# Patient Record
Sex: FEMALE | Race: WHITE | HISPANIC OR LATINO | Employment: UNEMPLOYED | URBAN - METROPOLITAN AREA
[De-identification: names, ages, dates, MRNs, and addresses within clinical notes are randomized per-mention and may not be internally consistent; named-entity substitution may affect disease eponyms.]

---

## 2017-09-19 ENCOUNTER — GENERIC CONVERSION - ENCOUNTER (OUTPATIENT)
Dept: OTHER | Facility: OTHER | Age: 62
End: 2017-09-19

## 2017-09-19 ENCOUNTER — ALLSCRIPTS OFFICE VISIT (OUTPATIENT)
Dept: OTHER | Facility: OTHER | Age: 62
End: 2017-09-19

## 2017-09-19 ENCOUNTER — TRANSCRIBE ORDERS (OUTPATIENT)
Dept: ADMINISTRATIVE | Facility: HOSPITAL | Age: 62
End: 2017-09-19

## 2017-09-19 DIAGNOSIS — R41.3 OTHER AMNESIA: ICD-10-CM

## 2017-09-19 DIAGNOSIS — G31.84 MCI (MILD COGNITIVE IMPAIRMENT) WITH MEMORY LOSS: Primary | ICD-10-CM

## 2017-09-19 DIAGNOSIS — G31.84 MILD COGNITIVE IMPAIRMENT: ICD-10-CM

## 2017-10-05 ENCOUNTER — GENERIC CONVERSION - ENCOUNTER (OUTPATIENT)
Dept: OTHER | Facility: OTHER | Age: 62
End: 2017-10-05

## 2017-10-23 ENCOUNTER — ALLSCRIPTS OFFICE VISIT (OUTPATIENT)
Dept: OTHER | Facility: OTHER | Age: 62
End: 2017-10-23

## 2017-10-24 NOTE — PROGRESS NOTES
Chief Complaint  CC: FULL BODY EXAM, LAST SEEN 2014  History of Present Illness  61-year-old female presents for overall checkup previously noted psoriasis  Patient had used topical steroids previously had not seen for over 3 years  Patient noted a flare of dry skin on her hands over the summer which appears to be getting better  Also concerned regarding numerous growths on her skin      Assessment  Assessed    1  Psoriasis vulgaris (696 1) (L40 0)   2  Seborrheic keratosis (702 19) (L82 1)   3  Screening for skin condition (V82 0) (Z13 89)    Active Problems  Problems    1  Benign essential hypertension (401 1) (I10)   2  Depressive disorder (311) (F32 9)   3  Female stress incontinence (625 6) (N39 3)   4  Generalized anxiety disorder (300 02) (F41 1)   5  GERD (gastroesophageal reflux disease) (530 81) (K21 9)   6  Heart murmur (785 2) (R01 1)   7  History of mixed hyperlipidemia (V12 29) (Z86 39)   8  Hypertension, unspecified type (401 9) (I10)   9  Insomnia (780 52) (G47 00)   10  Knee pain, left (719 46) (M25 562)   11  Lymphadenopathy (785 6) (R59 1)   12  MCI (mild cognitive impairment) (331 83) (G31 84)   13  Memory difficulties (780 93) (R41 3)   14  Neck pain (723 1) (M54 2)   15  Palpitations (785 1) (R00 2)   16  Psoriasis vulgaris (696 1) (L40 0)   17  Screening for skin condition (V82 0) (Z13 89)   18  Seborrheic keratosis (702 19) (L82 1)   19  Secondary polycythemia (289 0) (D75 1)   20  Type 2 diabetes with complication (317 70) (T09 0)   21  Vitamin D deficiency (268 9) (E55 9)    Past Medical History  Problems    1  History of Gallstones (574 20) (K80 20)   2  History of Hirsutism (704 1) (L68 0)   3  History of Hirsutism (704 1) (L68 0)   4  History of diabetes mellitus (V12 29) (Z86 39)   5  History of eczema (V13 3) (Z87 2)   6  History of gallbladder disease (V12 79) (Z87 19)   7  History of gastritis (V12 79) (Z87 19)   8  History of hyperlipidemia (V12 29) (Z86 39)   9   History of hypertension (V12 59) (Z86 79)   10  History of iron deficiency anemia (V12 3) (Z86 2)   11  History of psoriasis (V13 3) (Z87 2)   12  History of Hypertensive heart disease (402 90) (I11 9)   13  History of Hypertensive heart disease without heart failure (402 90) (I11 9)   14  History of Knee pain (719 46) (M25 569)   15  History of Phlebitis or thrombophlebitis of other deep vessel of lower extremity (451 19)    (I80 299)    The past medical history was reviewed  Surgical History    Surgical History reviewed      Family History  Family History Reviewed- Derm:   Family History was reviewed      Social History  The social history was reviewed      Current Meds   1  ALPRAZolam XR 1 MG Oral Tablet Extended Release 24 Hour; TAKE 1 TABLET Twice   daily PRN; Therapy: (Recorded:[id number]) to Recorded   2  Atorvastatin Calcium 10 MG Oral Tablet; Take 1 tablet daily; Therapy: (Recorded:75Dvr2320) to Recorded   3  Ergocalciferol 58355 UNIT CAPS; TAKE 1 CAPSULE Weekly; Therapy: (Recorded:63Mvk4930) to Recorded   4  Invokana 300 MG Oral Tablet; TAKE 1 TABLET EVERY MORNING; Therapy: (Recorded:46Zcf8427) to Recorded   5  Lantus 100 UNIT/ML Subcutaneous Solution; use as directed; Therapy: (Recorded:43Llt7426) to Recorded   6  Losartan Potassium 25 MG Oral Tablet; TAKE 1 TABLET DAILY AS DIRECTED; Therapy: (Recorded:57Djz1195) to Recorded   7  Meloxicam 7 5 MG Oral Tablet; take 1 tablet daily prn; Therapy: (Recorded:21Zly5353) to Recorded   8  MetFORMIN HCl  MG Oral Tablet Extended Release 24 Hour; [id number] to Recorded   9  Methocarbamol 750 MG Oral Tablet; take 1 tablet daily prn; Therapy: (Recorded:60Mbc5121) to Recorded   10  Metoprolol Tartrate 25 MG Oral Tablet; Take 1 tablet daily; Therapy: (Recorded:94Duh1333) to Recorded   11  Trospium Chloride ER 60 MG Oral Capsule Extended Release 24 Hour; take 1 capsule    daily; Therapy: (Recorded:44Ktk1543) to Recorded   12  Zolpidem Tartrate ER 12 5 MG Oral Tablet Extended Release; TAKE 1 TABLET AT    BEDTIME AS NEEDED FOR SLEEP; Therapy: (Recorded:82Umf3393) to Recorded    Review of Systems    Constitutional: Denies constitutional symptoms  Eyes: Denies eye symptoms  ENT:  denies ear symptoms, nasal symptoms, mouth or throat symptoms  Cardiovascular: Denies cardiovascular symptoms  Respiratory: Denies respiratory symptoms  Gastrointestinal: Denies gastrointestinal symptoms  Musculoskeletal: Denies musculoskeletal symptoms  Integumentary: Denies symptoms other than stated above  Neurological: Denies neurologic symptoms  Psychiatric: Denies psychiatric symptoms  Endocrine: Denies endocrine symptoms  Hematologic/Lymphatic: Denies hematologic symptoms  Allergies  Medication    1  Lexapro   2  Penicillins   3  Sulfa Drugs   4  Tubersol    Physical Exam    Constitutional   General appearance: Appears healthy and well developed  Lymphatic   No visible disturbance  Musculoskeletal   Digits and nails: No clubbing, cyanosis or edema  Cutaneous and nail exam normal     Skin   Scalp skin texture and hair distribution: Normal skin texture on scalp, normal hair distribution  Head: Normal turgor, no rashes, no lesions  Neck: Normal turgor, no rashes, no lesions  Chest: Normal turgor, no rashes, no lesions  Abdomen: Normal turgor, no rashes, no lesions  Back: Normal turgor, no rashes, no lesions  Right upper extremity: Abnormal     Left upper extremity: Abnormal     Right lower extremity: Normal turgor, no rashes, no lesions  Left lower extremity: Normal turgor, no rashes, no lesions  Neuro/Psych   Alert and oriented x 3  Displays comfort and cooperation during encounterl   Affect is normal     Finding Slight erythema scaling noted on the right arm also little bit on the left elbow normal keratotic papules with greasy stuck appearance nothing else atypical noted on complete exam  Plan  Psoriasis vulgaris    · Fluocinonide 0 05 % External Cream; APPLY SPARINGLY TO AFFECTED  AREA(S) TWICE DAILY  Screening for skin condition    · Follow-up visit in 1 year Evaluation and Treatment  Follow-up  Status: Hold For -  Scheduling  Requested for: [id number]    Discussion/Summary    Assessment #1: Psoriasis  Care Plan:   Minimally active continue topical therapy refills given follow-up if any worsening is noted  Assessment #2: Seborrheic keratosis  Care Plan:   Patient reassured these are normal growths we acquire with age no treatment needed  Assessment #3: Screening for dermatologic disorders  Care Plan:   Nothing else of concern noted on exam follow-up yearly        Future Appointments    Date/Time Provider Specialty Site   01/24/2018 02:40 PM Beba Mckeon MD Neurology [facility]     Signatures   Electronically signed by : PATTI Art ; Oct 23 2017  9:54AM EST                       (Author)

## 2018-01-22 VITALS
BODY MASS INDEX: 31.89 KG/M2 | DIASTOLIC BLOOD PRESSURE: 90 MMHG | WEIGHT: 180 LBS | RESPIRATION RATE: 16 BRPM | HEART RATE: 72 BPM | SYSTOLIC BLOOD PRESSURE: 132 MMHG | HEIGHT: 63 IN

## 2018-04-20 RX ORDER — ZOLPIDEM TARTRATE 12.5 MG/1
1 TABLET, FILM COATED, EXTENDED RELEASE ORAL
COMMUNITY
End: 2022-03-29 | Stop reason: ALTCHOICE

## 2018-04-20 RX ORDER — ATORVASTATIN CALCIUM 10 MG/1
1 TABLET, FILM COATED ORAL DAILY
COMMUNITY

## 2018-04-20 RX ORDER — MELOXICAM 7.5 MG/1
1 TABLET ORAL DAILY PRN
COMMUNITY
End: 2022-03-29 | Stop reason: ALTCHOICE

## 2018-04-20 RX ORDER — ERGOCALCIFEROL (VITAMIN D2) 1250 MCG
1 CAPSULE ORAL WEEKLY
COMMUNITY
End: 2022-03-29 | Stop reason: ALTCHOICE

## 2018-04-20 RX ORDER — LOSARTAN POTASSIUM 25 MG/1
1 TABLET ORAL DAILY
COMMUNITY

## 2018-04-20 RX ORDER — INSULIN GLARGINE 100 [IU]/ML
INJECTION, SOLUTION SUBCUTANEOUS
COMMUNITY

## 2018-04-20 RX ORDER — TROSPIUM CHLORIDE ER 60 MG/1
1 CAPSULE ORAL DAILY
COMMUNITY
End: 2022-03-29 | Stop reason: ALTCHOICE

## 2018-04-20 RX ORDER — METHOCARBAMOL 750 MG/1
1 TABLET, FILM COATED ORAL DAILY PRN
COMMUNITY
End: 2022-03-29 | Stop reason: ALTCHOICE

## 2018-04-20 RX ORDER — METFORMIN HYDROCHLORIDE 750 MG/1
TABLET, EXTENDED RELEASE ORAL
COMMUNITY

## 2018-04-20 RX ORDER — ALPRAZOLAM 1 MG/1
1 TABLET, EXTENDED RELEASE ORAL 2 TIMES DAILY PRN
COMMUNITY

## 2018-04-25 ENCOUNTER — OFFICE VISIT (OUTPATIENT)
Dept: NEUROLOGY | Facility: CLINIC | Age: 63
End: 2018-04-25
Payer: COMMERCIAL

## 2018-04-25 VITALS
HEART RATE: 114 BPM | WEIGHT: 164 LBS | BODY MASS INDEX: 29.06 KG/M2 | DIASTOLIC BLOOD PRESSURE: 90 MMHG | HEIGHT: 63 IN | SYSTOLIC BLOOD PRESSURE: 138 MMHG

## 2018-04-25 DIAGNOSIS — G31.84 MCI (MILD COGNITIVE IMPAIRMENT): Primary | ICD-10-CM

## 2018-04-25 DIAGNOSIS — F41.8 OTHER SPECIFIED ANXIETY DISORDERS: ICD-10-CM

## 2018-04-25 PROCEDURE — 99214 OFFICE O/P EST MOD 30 MIN: CPT | Performed by: PSYCHIATRY & NEUROLOGY

## 2018-04-25 NOTE — PROGRESS NOTES
Progress Note - Neurology   Mckenzie Garrdio 65 y o  female MRN: [id number]  Unit/Bed#:  Encounter: [id number]      Subjective:   Patient is here for follow-up visit with a history of anxiety, cognitive impairment, continues to have forgetfulness, but still remains quite anxious  She is also grieving the loss of her daughter and her  suffered a stroke, but overall she has been gradually improving and feeling better  Since her last visit the patient was advised blood work and an MRI which she has not been able to get done yet and claims her insurance company denied the same  ROS:   Review of Systems   Constitutional: Positive for appetite change  HENT: Positive for mouth sores  Eyes: Negative  Respiratory: Negative  Cardiovascular: Negative  Gastrointestinal: Negative  Endocrine: Negative  Genitourinary: Positive for frequency  Musculoskeletal: Negative  Skin: Negative  Allergic/Immunologic: Negative  Neurological: Positive for speech difficulty  Hematological: Bruises/bleeds easily  Psychiatric/Behavioral: The patient is nervous/anxious  Vitals:   Vitals:    04/25/18 0842   BP: 138/90   Pulse: (!) 114   ,Body mass index is 28 82 kg/m²      MEDS:      Current Outpatient Prescriptions:     ALPRAZolam ER (XANAX XR) 1 MG 24 hr tablet, Take 1 tablet by mouth 2 (two) times a day as needed, Disp: , Rfl:     atorvastatin (LIPITOR) 10 mg tablet, Take 1 tablet by mouth daily, Disp: , Rfl:     Canagliflozin (INVOKANA) 300 MG TABS, Take 1 tablet by mouth, Disp: , Rfl:     ergocalciferol (ERGOCALCIFEROL) 57381 units capsule, Take 1 capsule by mouth once a week, Disp: , Rfl:     insulin glargine (LANTUS) 100 units/mL subcutaneous injection, Inject under the skin, Disp: , Rfl:     losartan (COZAAR) 25 mg tablet, Take 1 tablet by mouth daily, Disp: , Rfl:     metFORMIN (GLUCOPHAGE-XR) 750 mg 24 hr tablet, Take by mouth, Disp: , Rfl:     metoprolol tartrate (LOPRESSOR) 25 mg tablet, Take 1 tablet by mouth daily, Disp: , Rfl:     trospium (SANCTURA XR) 60 mg 24 hr capsule, Take 1 capsule by mouth daily, Disp: , Rfl:     zolpidem (AMBIEN CR) 12 5 MG CR tablet, Take 1 tablet by mouth, Disp: , Rfl:     fluocinonide (LIDEX) 0 05 % cream, Apply topically 2 (two) times a day, Disp: , Rfl:     meloxicam (MOBIC) 7 5 mg tablet, Take 1 tablet by mouth daily as needed, Disp: , Rfl:     methocarbamol (ROBAXIN) 750 mg tablet, Take 1 tablet by mouth daily as needed, Disp: , Rfl:   :    Physical Exam:  General appearance: alert, appears stated age and cooperative  Head: Normocephalic, without obvious abnormality, atraumatic    Neurologic:  Her examination shows the [facility] cognitive assessment scale of 23/30 and the rest of her neurological examination was essentially unremarkable  Lab Results: I have personally reviewed pertinent reports  Imaging Studies: I have personally reviewed pertinent reports  Assessment:  1  Mild cognitive impairment  2  Anxiety disorder  Plan:  Patient is advised to discontinue Ambien at this time which she does use frequently on a p r n  basis and does not wish to get any further evaluation at this time since she feels she has been gradually improving and her symptoms are most likely related to her mood and anxiety     She is advised to return back to see me in 3-4 months     4/25/2018,9:03 AM    Dictation voice to text software has been used in the creation of this document  Please consider this in light of any contextual or grammatical errors

## 2018-10-29 ENCOUNTER — OFFICE VISIT (OUTPATIENT)
Dept: DERMATOLOGY | Facility: CLINIC | Age: 63
End: 2018-10-29
Payer: COMMERCIAL

## 2018-10-29 DIAGNOSIS — Z13.89 SCREENING FOR SKIN CONDITION: ICD-10-CM

## 2018-10-29 DIAGNOSIS — L82.1 SEBORRHEIC KERATOSIS: ICD-10-CM

## 2018-10-29 DIAGNOSIS — L40.9 PSORIASIS: Primary | ICD-10-CM

## 2018-10-29 PROCEDURE — 99213 OFFICE O/P EST LOW 20 MIN: CPT | Performed by: DERMATOLOGY

## 2018-10-29 NOTE — PATIENT INSTRUCTIONS
Psoriasis under control continue same therapy  Seborrheic Keratosis  Patient reasurred these are normal growths we acquire with age no treatment needed    Screening for Dermatologic Disorders: Nothing else of concern noted on complete exam follow up in 1 year

## 2018-10-29 NOTE — PROGRESS NOTES
[facility] DERMATOLOGY  [address] [place] [address]  [phone]     MRN: [id number] : 1955  Encounter: [id number]  Patient Information: Ashkan Sow  Chief complaint:  Yearly check up    History of present illness:  72-year-old female with history of psoriasis presents for overall checkup no specific concerns noted  Past Medical History:   Diagnosis Date    Anxiety disorder     Benign essential hypertension     Depressive disorder     Diabetes mellitus with complication ([address] )     SANDI (generalized anxiety disorder)     GERD (gastroesophageal reflux disease)     Heart murmur     Insomnia     Knee pain, left     Lymphadenopathy     MCI (mild cognitive impairment)     Neck pain     Palpitations     Psoriasis vulgaris     Seborrheic keratosis     Secondary polycythemia     Vitamin D deficiency      Past Surgical History:   Procedure Laterality Date    BLADDER SURGERY       Social History   History   Alcohol Use No     History   Drug Use No     History   Smoking Status    Former Smoker   Smokeless Tobacco    Never Used     Family History   Problem Relation Age of Onset    Diabetes Mother      Meds/Allergies   Allergies   Allergen Reactions    Escitalopram Hives    Liraglutide Hives and Vomiting    Penicillins Hives    Tuberculin Ppd Other (See Comments)    Latex Rash    Sulfa Antibiotics Rash       Meds:  Prior to Admission medications    Medication Sig Start Date End Date Taking?  Authorizing Provider   ALPRAZolam ER (XANAX XR) 1 MG 24 hr tablet Take 1 tablet by mouth 2 (two) times a day as needed   Yes Historical Provider, MD   atorvastatin (LIPITOR) 10 mg tablet Take 1 tablet by mouth daily   Yes Historical Provider, MD   Canagliflozin (INVOKANA) 300 MG TABS Take 1 tablet by mouth   Yes Historical Provider, MD   ergocalciferol (ERGOCALCIFEROL) 09761 units capsule Take 1 capsule by mouth once a week   Yes Historical Provider, MD   fluocinonide (LIDEX) 0 05 % cream Apply topically 2 (two) times a day 7/24/14  Yes Historical Provider, MD   insulin glargine (LANTUS) 100 units/mL subcutaneous injection Inject under the skin   Yes Historical Provider, MD   losartan (COZAAR) 25 mg tablet Take 1 tablet by mouth daily   Yes Historical Provider, MD   meloxicam (MOBIC) 7 5 mg tablet Take 1 tablet by mouth daily as needed   Yes Historical Provider, MD   metFORMIN (GLUCOPHAGE-XR) 750 mg 24 hr tablet Take by mouth   Yes Historical Provider, MD   methocarbamol (ROBAXIN) 750 mg tablet Take 1 tablet by mouth daily as needed   Yes Historical Provider, MD   metoprolol tartrate (LOPRESSOR) 25 mg tablet Take 1 tablet by mouth daily   Yes Historical Provider, MD   trospium (SANCTURA XR) 60 mg 24 hr capsule Take 1 capsule by mouth daily   Yes Historical Provider, MD   zolpidem (AMBIEN CR) 12 5 MG CR tablet Take 1 tablet by mouth   Yes Historical Provider, MD       Subjective:     Review of Systems:    General: negative for - chills, fatigue, fever,  weight gain or weight loss  Psychological: negative for - anxiety, behavioral disorder, concentration difficulties, decreased libido, depression, irritability, memory difficulties, mood swings, sleep disturbances or suicidal ideation  ENT: negative for - hearing difficulties , nasal congestion, nasal discharge, oral lesions, sinus pain, sneezing, sore throat  Allergy and Immunology: negative for - hives, insect bite sensitivity,  Hematological and Lymphatic: negative for - bleeding problems, blood clots,bruising, swollen lymph nodes  Endocrine: negative for - hair pattern changes, hot flashes, malaise/lethargy, mood swings, palpitations, polydipsia/polyuria, skin changes, temperature intolerance or unexpected weight change  Respiratory: negative for - cough, hemoptysis, orthopnea, shortness of breath, or wheezing  Cardiovascular: negative for - chest pain, dyspnea on exertion, edema,  Gastrointestinal: negative for - abdominal pain, nausea/vomiting  Genito-Urinary: negative for - dysuria, incontinence, irregular/heavy menses or urinary frequency/urgency  Musculoskeletal: negative for - gait disturbance, joint pain, joint stiffness, joint swelling, muscle pain, muscular weakness  Dermatological:  As in HPI  Neurological: negative for confusion, dizziness, headaches, impaired coordination/balance, memory loss, numbness/tingling, seizures, speech problems, tremors or weakness       Objective: There were no vitals taken for this visit  Physical Exam:    General Appearance:    Alert, cooperative, no distress   Head:    Normocephalic, without obvious abnormality, atraumatic           Skin:   A full skin exam was performed including scalp, head scalp, eyes, ears, nose, lips, neck, chest, axilla, abdomen, back, buttocks, bilateral upper extremities, bilateral lower extremities, hands, feet, fingers, toes, fingernails, and toenails slight scaling erythematous patch noted on the left elbow a little bit on the left upper arm no other evidence of psoriasis noted normal keratotic papules with greasy stuck on appearance nothing else remarkable noted on exam     Assessment:     1  Psoriasis     2  Screening for skin condition     3  Seborrheic keratosis           Plan:   Psoriasis under control continue same therapy  Seborrheic Keratosis  Patient reasurred these are normal growths we acquire with age no treatment needed  Screening for Dermatologic Disorders: Nothing else of concern noted on complete exam follow up in 1 year       Robert Gonzalez MD  10/29/2018,7:59 AM    Portions of the record may have been created with voice recognition software   Occasional wrong word or "sound a like" substitutions may have occurred due to the inherent limitations of voice recognition software   Read the chart carefully and recognize, using context, where substitutions have occurred

## 2019-11-04 ENCOUNTER — OFFICE VISIT (OUTPATIENT)
Dept: DERMATOLOGY | Facility: CLINIC | Age: 64
End: 2019-11-04
Payer: COMMERCIAL

## 2019-11-04 DIAGNOSIS — Z13.89 SCREENING FOR SKIN CONDITION: ICD-10-CM

## 2019-11-04 DIAGNOSIS — L82.1 SEBORRHEIC KERATOSIS: ICD-10-CM

## 2019-11-04 DIAGNOSIS — L40.9 PSORIASIS: Primary | ICD-10-CM

## 2019-11-04 DIAGNOSIS — L65.9 ALOPECIA: ICD-10-CM

## 2019-11-04 PROCEDURE — 99213 OFFICE O/P EST LOW 20 MIN: CPT | Performed by: DERMATOLOGY

## 2019-11-04 RX ORDER — NICOTINE POLACRILEX 2 MG
GUM BUCCAL
COMMUNITY
End: 2022-03-29 | Stop reason: ALTCHOICE

## 2019-11-04 RX ORDER — UREA 10 %
10 LOTION (ML) TOPICAL
COMMUNITY

## 2019-11-04 RX ORDER — BUSPIRONE HYDROCHLORIDE 15 MG/1
15 TABLET ORAL 3 TIMES DAILY
COMMUNITY
End: 2022-03-29 | Stop reason: ALTCHOICE

## 2019-11-04 NOTE — PATIENT INSTRUCTIONS
Psoriasis appears quite minimal will go ahead and see if  This response suggest mild hydrocortisone cream at this time  Seborrheic Keratosis  Patient reasurred these are normal growths we acquire with age no treatment needed    Hair loss probably a combination of both telogen effluvium and pattern hair loss we discussed the potential using Rogaine  Screening for Dermatologic Disorders: Nothing else of concern noted on complete exam follow up in 1 year

## 2019-11-04 NOTE — PROGRESS NOTES
[address]  [address] [facility] [address]  [phone]  [phone]     MRN: [id number] : 1955  Encounter: [id number]  Patient Information: Lois Foster  Chief complaint:  Yearly skin check concerned regarding hair loss    History of present illness:  60-year-old female presents for overall skin check history of psoriasis in the past complaining of some scaling areas that developed also in the intergluteal area patient also notes hair loss  Past Medical History:   Diagnosis Date    Anxiety disorder     Benign essential hypertension     Depressive disorder     Diabetes mellitus with complication ([facility] [address] )     SANDI (generalized anxiety disorder)     GERD (gastroesophageal reflux disease)     Heart murmur     Insomnia     Knee pain, left     Lymphadenopathy     MCI (mild cognitive impairment)     Neck pain     Palpitations     Psoriasis vulgaris     Seborrheic keratosis     Secondary polycythemia     Vitamin D deficiency      Past Surgical History:   Procedure Laterality Date    BLADDER SURGERY       Social History   Social History     Substance and Sexual Activity   Alcohol Use No     Social History     Substance and Sexual Activity   Drug Use No     Social History     Tobacco Use   Smoking Status Former Smoker   Smokeless Tobacco Never Used     Family History   Problem Relation Age of Onset    Diabetes Mother      Meds/Allergies   Allergies   Allergen Reactions    Escitalopram Hives    Liraglutide Hives and Vomiting    Penicillins Hives    Tuberculin Ppd Other (See Comments)    Latex Rash    Sulfa Antibiotics Rash       Meds:  Prior to Admission medications    Medication Sig Start Date End Date Taking?  Authorizing Provider   ALPRAZolam ER (XANAX XR) 1 MG 24 hr tablet Take 1 tablet by mouth 2 (two) times a day as needed   Yes Historical Provider, MD   atorvastatin (LIPITOR) 10 mg tablet Take 1 tablet by mouth daily   Yes Historical Provider, MD   Biotin 1 MG CAPS Take by mouth   Yes Historical Provider, MD   busPIRone (BUSPAR) 15 mg tablet Take 15 mg by mouth 3 (three) times a day   Yes Historical Provider, MD   Canagliflozin (INVOKANA) 300 MG TABS Take 1 tablet by mouth   Yes Historical Provider, MD   ergocalciferol (ERGOCALCIFEROL) 06580 units capsule Take 1 capsule by mouth once a week   Yes Historical Provider, MD   insulin glargine (LANTUS) 100 units/mL subcutaneous injection Inject under the skin   Yes Historical Provider, MD   losartan (COZAAR) 25 mg tablet Take 1 tablet by mouth daily   Yes Historical Provider, MD   melatonin 1 mg Take 1 mg by mouth daily at bedtime   Yes Historical Provider, MD   metFORMIN (GLUCOPHAGE-XR) 750 mg 24 hr tablet Take by mouth   Yes Historical Provider, MD   metoprolol tartrate (LOPRESSOR) 25 mg tablet Take 1 tablet by mouth daily   Yes Historical Provider, MD   trospium (SANCTURA XR) 60 mg 24 hr capsule Take 1 capsule by mouth daily   Yes Historical Provider, MD   zolpidem (AMBIEN CR) 12 5 MG CR tablet Take 1 tablet by mouth   Yes Historical Provider, MD   fluocinonide (LIDEX) 0 05 % cream Apply topically 2 (two) times a day To psoriasis patches till resolve  Patient not taking: Reported on 11/4/2019 10/29/18   Tim Mcnair MD   meloxicam (MOBIC) 7 5 mg tablet Take 1 tablet by mouth daily as needed    Historical Provider, MD   methocarbamol (ROBAXIN) 750 mg tablet Take 1 tablet by mouth daily as needed    Historical Provider, MD       Subjective:     Review of Systems:    General: negative for - chills, fatigue, fever,  weight gain or weight loss  Psychological: negative for - anxiety, behavioral disorder, concentration difficulties, decreased libido, depression, irritability, memory difficulties, mood swings, sleep disturbances or suicidal ideation  ENT: negative for - hearing difficulties , nasal congestion, nasal discharge, oral lesions, sinus pain, sneezing, sore throat  Allergy and Immunology: negative for - hives, insect bite sensitivity,  Hematological and Lymphatic: negative for - bleeding problems, blood clots,bruising, swollen lymph nodes  Endocrine: negative for - hair pattern changes, hot flashes, malaise/lethargy, mood swings, palpitations, polydipsia/polyuria, skin changes, temperature intolerance or unexpected weight change  Respiratory: negative for - cough, hemoptysis, orthopnea, shortness of breath, or wheezing  Cardiovascular: negative for - chest pain, dyspnea on exertion, edema,  Gastrointestinal: negative for - abdominal pain, nausea/vomiting  Genito-Urinary: negative for - dysuria, incontinence, irregular/heavy menses or urinary frequency/urgency  Musculoskeletal: negative for - gait disturbance, joint pain, joint stiffness, joint swelling, muscle pain, muscular weakness  Dermatological:  As in HPI  Neurological: negative for confusion, dizziness, headaches, impaired coordination/balance, memory loss, numbness/tingling, seizures, speech problems, tremors or weakness       Objective: There were no vitals taken for this visit  Physical Exam:    General Appearance:    Alert, cooperative, no distress   Head:    Normocephalic, without obvious abnormality, atraumatic           Skin:   A full skin exam was performed including scalp, head scalp, eyes, ears, nose, lips, neck, chest, axilla, abdomen, back, buttocks, bilateral upper extremities, bilateral lower extremities, hands, feet, fingers, toes, fingernails, and toenails normal keratotic papules greasy stuck on appearance erythema scaling patches noted little bit in the intergluteal area on the left arm and on the nasal creases nothing else remarkable noted on exam some slight hair pull positive       Assessment:     1  Psoriasis  hydrocortisone 2 5 % cream   2  Seborrheic keratosis     3  Screening for skin condition     4   Alopecia           Plan:   Psoriasis appears quite minimal will go ahead and see if  This response suggest mild hydrocortisone cream at this time  Seborrheic Keratosis  Patient reasurred these are normal growths we acquire with age no treatment needed  Hair loss probably a combination of both telogen effluvium and pattern hair loss we discussed the potential using Rogaine  Screening for Dermatologic Disorders: Nothing else of concern noted on complete exam follow up in 1 year       Chad Fisher MD  11/4/2019,8:32 AM    Portions of the record may have been created with voice recognition software   Occasional wrong word or "sound a like" substitutions may have occurred due to the inherent limitations of voice recognition software   Read the chart carefully and recognize, using context, where substitutions have occurred

## 2020-12-08 ENCOUNTER — TRANSCRIBE ORDERS (OUTPATIENT)
Dept: ADMINISTRATIVE | Facility: HOSPITAL | Age: 65
End: 2020-12-08

## 2020-12-08 ENCOUNTER — TRANSCRIBE ORDERS (OUTPATIENT)
Dept: LAB | Facility: CLINIC | Age: 65
End: 2020-12-08

## 2020-12-08 DIAGNOSIS — E04.2 NONTOXIC MULTINODULAR GOITER: Primary | ICD-10-CM

## 2020-12-08 DIAGNOSIS — Z78.0 POST-MENOPAUSE: Primary | ICD-10-CM

## 2021-01-13 ENCOUNTER — OFFICE VISIT (OUTPATIENT)
Dept: DERMATOLOGY | Facility: CLINIC | Age: 66
End: 2021-01-13
Payer: COMMERCIAL

## 2021-01-13 VITALS — TEMPERATURE: 99 F

## 2021-01-13 DIAGNOSIS — L85.3 XEROSIS CUTIS: Primary | ICD-10-CM

## 2021-01-13 DIAGNOSIS — L40.9 PSORIASIS: ICD-10-CM

## 2021-01-13 DIAGNOSIS — D69.2 PIGMENTED PURPURA (HCC): ICD-10-CM

## 2021-01-13 DIAGNOSIS — L82.1 SEBORRHEIC KERATOSIS: ICD-10-CM

## 2021-01-13 DIAGNOSIS — Z13.89 SCREENING FOR SKIN CONDITION: ICD-10-CM

## 2021-01-13 PROCEDURE — 99213 OFFICE O/P EST LOW 20 MIN: CPT | Performed by: DERMATOLOGY

## 2021-01-13 RX ORDER — PAROXETINE HYDROCHLORIDE 20 MG/1
TABLET, FILM COATED ORAL EVERY 24 HOURS
COMMUNITY

## 2021-01-13 NOTE — PATIENT INSTRUCTIONS
Xerosis cutis patient advise that this is related dry skin related to her diabetes also probably related to her ambient dry area home with use of propane heat suggested a use of a humidifier as well as a with a moisturizer as much as possible  In addition suggested use of tepid water for bathing  Psoriasis does not appear real active no treatment needed  Pigmented purpura advised patient that this is related to inflammation in the veins in the lower leg and leakage of blood no treatment indicated however support stockings could be considered  Seborrheic Keratosis  Patient reasurred these are normal growths we acquire with age no treatment needed    Screening for Dermatologic Disorders: Nothing else of concern noted on complete exam follow up in 1 year

## 2021-01-13 NOTE — PROGRESS NOTES
[address] [facility] [address]  [phone]  [phone]     MRN: [id number] : 1955  Encounter: [id number]  Patient Information: Rivera Ferrari  Chief complaint: yearly check up    History of present illness:  52-year-old female with history of diabetes presents for overall checkup concerned regarding overall dry skin no other concerns noted except numerous other growths on her skin patient with previous history of psoriasis  Past Medical History:   Diagnosis Date    Anxiety disorder     Benign essential hypertension     Depressive disorder     Diabetes mellitus with complication ([facility] [address] )     SANDI (generalized anxiety disorder)     GERD (gastroesophageal reflux disease)     Heart murmur     Insomnia     Knee pain, left     Lymphadenopathy     MCI (mild cognitive impairment)     Neck pain     Palpitations     Psoriasis vulgaris     Seborrheic keratosis     Secondary polycythemia     Vitamin D deficiency      Past Surgical History:   Procedure Laterality Date    BLADDER SURGERY       Social History   Social History     Substance and Sexual Activity   Alcohol Use No     Social History     Substance and Sexual Activity   Drug Use No     Social History     Tobacco Use   Smoking Status Former Smoker   Smokeless Tobacco Never Used     Family History   Problem Relation Age of Onset    Diabetes Mother      Meds/Allergies   Allergies   Allergen Reactions    Escitalopram Hives    Liraglutide Hives and Vomiting    Penicillins Hives    Tuberculin Ppd Other (See Comments)    Latex Rash    Sulfa Antibiotics Rash       Meds:  Prior to Admission medications    Medication Sig Start Date End Date Taking?  Authorizing Provider   ALPRAZolam ER (XANAX XR) 1 MG 24 hr tablet Take 1 tablet by mouth 2 (two) times a day as needed   Yes Historical Provider, MD   atorvastatin (LIPITOR) 10 mg tablet Take 1 tablet by mouth daily   Yes Historical Provider, MD   Biotin 1 MG CAPS Take by mouth   Yes Historical Provider, MD   busPIRone (BUSPAR) 15 mg tablet Take 15 mg by mouth 3 (three) times a day   Yes Historical Provider, MD   Canagliflozin (INVOKANA) 300 MG TABS Take 1 tablet by mouth   Yes Historical Provider, MD   ergocalciferol (ERGOCALCIFEROL) 58936 units capsule Take 1 capsule by mouth once a week   Yes Historical Provider, MD   hydrocortisone 2 5 % cream Apply topically 2 (two) times a day To psoriasis till clear 11/4/19  Yes Chad Fisher MD   insulin glargine (LANTUS) 100 units/mL subcutaneous injection Inject under the skin   Yes Historical Provider, MD   losartan (COZAAR) 25 mg tablet Take 1 tablet by mouth daily   Yes Historical Provider, MD   melatonin 1 mg Take 1 mg by mouth daily at bedtime   Yes Historical Provider, MD   meloxicam (MOBIC) 7 5 mg tablet Take 1 tablet by mouth daily as needed   Yes Historical Provider, MD   metFORMIN (GLUCOPHAGE-XR) 750 mg 24 hr tablet Take by mouth   Yes Historical Provider, MD   methocarbamol (ROBAXIN) 750 mg tablet Take 1 tablet by mouth daily as needed   Yes Historical Provider, MD   metoprolol tartrate (LOPRESSOR) 25 mg tablet Take 1 tablet by mouth daily   Yes Historical Provider, MD   PARoxetine (PAXIL) 20 mg tablet every 24 hours   Yes Historical Provider, MD   trospium (SANCTURA XR) 60 mg 24 hr capsule Take 1 capsule by mouth daily   Yes Historical Provider, MD   zolpidem (AMBIEN CR) 12 5 MG CR tablet Take 1 tablet by mouth   Yes Historical Provider, MD   fluocinonide (LIDEX) 0 05 % cream Apply topically 2 (two) times a day To psoriasis patches till resolve  Patient not taking: Reported on 11/4/2019 10/29/18   Chad Fisher MD       Subjective:     Review of Systems:    General: negative for - chills, fatigue, fever,  weight gain or weight loss  Psychological: negative for - anxiety, behavioral disorder, concentration difficulties, decreased libido, depression, irritability, memory difficulties, mood swings, sleep disturbances or suicidal ideation  ENT: negative for - hearing difficulties , nasal congestion, nasal discharge, oral lesions, sinus pain, sneezing, sore throat  Allergy and Immunology: negative for - hives, insect bite sensitivity,  Hematological and Lymphatic: negative for - bleeding problems, blood clots,bruising, swollen lymph nodes  Endocrine: negative for - hair pattern changes, hot flashes, malaise/lethargy, mood swings, palpitations, polydipsia/polyuria, skin changes, temperature intolerance or unexpected weight change  Respiratory: negative for - cough, hemoptysis, orthopnea, shortness of breath, or wheezing  Cardiovascular: negative for - chest pain, dyspnea on exertion, edema,  Gastrointestinal: negative for - abdominal pain, nausea/vomiting  Genito-Urinary: negative for - dysuria, incontinence, irregular/heavy menses or urinary frequency/urgency  Musculoskeletal: negative for - gait disturbance, joint pain, joint stiffness, joint swelling, muscle pain, muscular weakness  Dermatological:  As in HPI  Neurological: negative for confusion, dizziness, headaches, impaired coordination/balance, memory loss, numbness/tingling, seizures, speech problems, tremors or weakness       Objective:   Temp 99 °F (37 2 °C)     Physical Exam:    General Appearance:    Alert, cooperative, no distress   Head:    Normocephalic, without obvious abnormality, atraumatic           Skin:   A full skin exam was performed including scalp, head scalp, eyes, ears, nose, lips, neck, chest, axilla, abdomen, back, buttocks, bilateral upper extremities, bilateral lower extremities, hands, feet, fingers, toes, fingernails, and toenails no active psoriasis noted normal keratotic papules with greasy stuck on appearance overall dry scaly skin nothing else remarkable on exam except slight hyperpigmentation noted on the lower legs     Assessment:     1  Xerosis cutis     2  Seborrheic keratosis     3  Psoriasis     4   Pigmented purpura ([address] )     5  Screening for skin condition           Plan:   Xerosis cutis patient advise that this is related dry skin related to her diabetes also probably related to her ambient dry area home with use of propane heat suggested a use of a humidifier as well as a with a moisturizer as much as possible  In addition suggested use of tepid water for bathing  Psoriasis does not appear real active no treatment needed  Pigmented purpura advised patient that this is related to inflammation in the veins in the lower leg and leakage of blood no treatment indicated however support stockings could be considered  Seborrheic Keratosis  Patient reasurred these are normal growths we acquire with age no treatment needed  Screening for Dermatologic Disorders: Nothing else of concern noted on complete exam follow up in 1 year       Kasey Thao MD  1/13/2021,10:04 AM    Portions of the record may have been created with voice recognition software   Occasional wrong word or "sound a like" substitutions may have occurred due to the inherent limitations of voice recognition software   Read the chart carefully and recognize, using context, where substitutions have occurred

## 2021-02-17 ENCOUNTER — TRANSCRIBE ORDERS (OUTPATIENT)
Dept: ADMINISTRATIVE | Facility: HOSPITAL | Age: 66
End: 2021-02-17

## 2021-02-17 DIAGNOSIS — R10.2 PERINEAL NEURALGIA, UNSPECIFIED LATERALITY: Primary | ICD-10-CM

## 2021-03-02 ENCOUNTER — HOSPITAL ENCOUNTER (OUTPATIENT)
Dept: ULTRASOUND IMAGING | Facility: CLINIC | Age: 66
Discharge: HOME | End: 2021-03-02
Payer: COMMERCIAL

## 2021-03-02 DIAGNOSIS — R10.2 PERINEAL NEURALGIA, UNSPECIFIED LATERALITY: ICD-10-CM

## 2021-03-02 PROCEDURE — 76856 US EXAM PELVIC COMPLETE: CPT

## 2021-03-02 PROCEDURE — 76830 TRANSVAGINAL US NON-OB: CPT

## 2021-03-10 DIAGNOSIS — Z23 ENCOUNTER FOR IMMUNIZATION: ICD-10-CM

## 2021-06-14 ENCOUNTER — TELEPHONE (OUTPATIENT)
Dept: GASTROENTEROLOGY | Facility: CLINIC | Age: 66
End: 2021-06-14

## 2021-11-22 ENCOUNTER — OFFICE VISIT (OUTPATIENT)
Dept: DERMATOLOGY | Facility: CLINIC | Age: 66
End: 2021-11-22
Payer: MEDICARE

## 2021-11-22 VITALS — HEIGHT: 63 IN | WEIGHT: 165.38 LBS | BODY MASS INDEX: 29.3 KG/M2

## 2021-11-22 DIAGNOSIS — L85.3 XEROSIS CUTIS: Primary | ICD-10-CM

## 2021-11-22 PROCEDURE — 99213 OFFICE O/P EST LOW 20 MIN: CPT | Performed by: DERMATOLOGY

## 2021-11-22 RX ORDER — METFORMIN HYDROCHLORIDE 500 MG/1
TABLET, EXTENDED RELEASE ORAL
COMMUNITY
Start: 2021-10-01

## 2021-11-22 RX ORDER — EMPAGLIFLOZIN 25 MG/1
25 TABLET, FILM COATED ORAL DAILY
COMMUNITY
Start: 2021-10-29

## 2022-02-28 ENCOUNTER — OFFICE VISIT (OUTPATIENT)
Dept: OBGYN CLINIC | Facility: CLINIC | Age: 67
End: 2022-02-28
Payer: MEDICARE

## 2022-02-28 VITALS
DIASTOLIC BLOOD PRESSURE: 80 MMHG | HEART RATE: 99 BPM | BODY MASS INDEX: 29.59 KG/M2 | WEIGHT: 167 LBS | HEIGHT: 63 IN | SYSTOLIC BLOOD PRESSURE: 138 MMHG

## 2022-02-28 DIAGNOSIS — M25.361 KNEE INSTABILITY, RIGHT: ICD-10-CM

## 2022-02-28 DIAGNOSIS — M62.9 HAMSTRING TIGHTNESS OF BOTH LOWER EXTREMITIES: ICD-10-CM

## 2022-02-28 DIAGNOSIS — R29.898 WEAKNESS OF BOTH HIPS: ICD-10-CM

## 2022-02-28 DIAGNOSIS — S76.311A HAMSTRING STRAIN, RIGHT, INITIAL ENCOUNTER: ICD-10-CM

## 2022-02-28 DIAGNOSIS — M17.11 PRIMARY OSTEOARTHRITIS OF RIGHT KNEE: Primary | ICD-10-CM

## 2022-02-28 PROCEDURE — 99204 OFFICE O/P NEW MOD 45 MIN: CPT | Performed by: FAMILY MEDICINE

## 2022-02-28 RX ORDER — MELOXICAM 15 MG/1
15 TABLET ORAL DAILY
Qty: 30 TABLET | Refills: 1 | Status: SHIPPED | OUTPATIENT
Start: 2022-02-28 | End: 2022-03-29 | Stop reason: ALTCHOICE

## 2022-02-28 NOTE — PROGRESS NOTES
Assessment/Plan:  Assessment/Plan   Diagnoses and all orders for this visit:    Primary osteoarthritis of right knee  -     meloxicam (Mobic) 15 mg tablet; Take 1 tablet (15 mg total) by mouth daily  -     Ambulatory Referral to Physical Therapy; Future    Weakness of both hips  -     Ambulatory Referral to Physical Therapy; Future    Hamstring tightness of both lower extremities  -     Ambulatory Referral to Physical Therapy; Future    Hamstring strain, right, initial encounter  -     Ambulatory Referral to Physical Therapy; Future    Knee instability, right  -     Brace  -     Ambulatory Referral to Physical Therapy; Future  -     Cane      81-year-old female with right knee pain and instability of 3 days duration  Discussed with patient physical exam, imaging studies, impression and plan  X-rays noted for degenerative changes of the right knee  Physical exam noted for medial soft tissue swelling  She has tenderness at the medial joint line, distal medial hamstring  She has extension limited to -5° and flexion to 110°  There is no appreciable collateral ligament laxity  She has hamstring tightness both lower extremities  She has weakness both hips with flexion and abduction  Clinical impression is that she is symptomatic from degenerative changes of the knee and muscle strain  I discussed regimen of anti-inflammatory, supplements, bracing, and physical therapy  She declines corticosteroid injection at this time  She is to take meloxicam 15 mg once daily with food for 30 days and during that time not to take any ibuprofen or Aleve  She may apply topical diclofenac gel as needed  She is to start taking tumeric 500 mg twice daily, tart cherry at least 1000 mg daily, and glucosamine-chondroitin 2 to 3 times a day  I provided her with hinged knee brace to help with stability  She is to start physical therapy as soon as possible and do home exercises as directed    She will follow up with me as needed  Subjective:   Patient ID: Linard Leventhal is a 73 y o  female  Chief Complaint   Patient presents with    Right Knee - Pain       61-year-old female presents for evaluation of right knee pain of 3 days duration  She denies any particular trauma or inciting event  Pain described as sudden in onset, localized to the medial aspect knee, nonradiating, worse with bearing weight and ambulating, worse with twisting, associated with swelling, and improved resting  She has been applying topical diclofenac gel to help with symptoms  She has had difficulty ambulating and has been using her mother's walking cane to help with stability  Knee Pain  This is a new problem  The current episode started in the past 7 days  The problem occurs daily  The problem has been unchanged  Associated symptoms include arthralgias and joint swelling  Pertinent negatives include no abdominal pain, chest pain, chills, fever, numbness, rash, sore throat or weakness  The symptoms are aggravated by standing, walking and twisting  She has tried rest and NSAIDs for the symptoms  The treatment provided mild relief  The following portions of the patient's history were reviewed and updated as appropriate: She  has a past medical history of Anxiety disorder, Benign essential hypertension, Depressive disorder, Diabetes mellitus with complication ([address] ), SANDI (generalized anxiety disorder), GERD (gastroesophageal reflux disease), Heart murmur, Insomnia, Knee pain, left, Lymphadenopathy, MCI (mild cognitive impairment), Neck pain, Palpitations, Psoriasis vulgaris, Seborrheic keratosis, Secondary polycythemia, and Vitamin D deficiency  She  has a past surgical history that includes Bladder surgery  Her family history includes Diabetes in her mother  She  reports that she has quit smoking  She has never used smokeless tobacco  She reports that she does not drink alcohol and does not use drugs    She is allergic to acetaminophen, escitalopram, hydrocodone-acetaminophen, liraglutide, penicillins, tuberculin ppd, latex, and sulfa antibiotics       Review of Systems   Constitutional: Negative for chills and fever  HENT: Negative for sore throat  Eyes: Negative for visual disturbance  Respiratory: Negative for shortness of breath  Cardiovascular: Negative for chest pain  Gastrointestinal: Negative for abdominal pain  Genitourinary: Negative for flank pain  Musculoskeletal: Positive for arthralgias and joint swelling  Skin: Negative for rash and wound  Neurological: Negative for weakness and numbness  Hematological: Does not bruise/bleed easily  Psychiatric/Behavioral: Negative for self-injury  Objective:  Vitals:    02/28/22 1349   BP: 138/80   Pulse: 99   Weight: 75 8 kg (167 lb)   Height: 5' 3 25" (1 607 m)     Right Ankle Exam     Tenderness   The patient is experiencing no tenderness  Swelling: none    Range of Motion   Dorsiflexion: normal   Plantar flexion: normal     Muscle Strength   Dorsiflexion:  5/5  Plantar flexion:  5/5      Right Knee Exam     Muscle Strength   The patient has normal right knee strength  Tenderness   The patient is experiencing tenderness in the medial joint line and medial hamstring  Range of Motion   Extension: -5   Flexion: 110     Tests   Varus: negative Valgus: negative    Other   Swelling: mild      Left Knee Exam     Other   Swelling: none      Right Hip Exam     Muscle Strength   Abduction: 4/5   Flexion: 4/5     Comments:  Hamstring tightness      Left Hip Exam     Muscle Strength   Abduction: 4/5   Flexion: 4/5     Comments:  Hamstring tightness          Strength/Myotome Testing     Right Ankle/Foot   Dorsiflexion: 5  Plantar flexion: 5      Physical Exam  Vitals and nursing note reviewed  Constitutional:       General: She is not in acute distress  Appearance: She is well-developed  She is not ill-appearing or diaphoretic  HENT:      Head: Normocephalic  Right Ear: External ear normal       Left Ear: External ear normal    Eyes:      Conjunctiva/sclera: Conjunctivae normal    Neck:      Trachea: No tracheal deviation  Cardiovascular:      Rate and Rhythm: Normal rate  Pulmonary:      Effort: Pulmonary effort is normal  No respiratory distress  Abdominal:      General: There is no distension  Musculoskeletal:         General: Swelling and tenderness present  No deformity or signs of injury  Skin:     General: Skin is warm and dry  Coloration: Skin is not jaundiced or pale  Neurological:      Mental Status: She is alert and oriented to person, place, and time  Psychiatric:         Mood and Affect: Mood normal          Behavior: Behavior normal          Thought Content:  Thought content normal          Judgment: Judgment normal

## 2022-03-01 ENCOUNTER — TELEPHONE (OUTPATIENT)
Dept: OBGYN CLINIC | Facility: CLINIC | Age: 67
End: 2022-03-01

## 2022-03-01 NOTE — TELEPHONE ENCOUNTER
After speaking with you, I s/w her, w/ your instructions to stop taking the meloxican  Alternate motrin & tylenol if that works better for her, use ice, and that you recommended having a cortisone inj  She understood everything and was greatful for the call  She wants to come in for the injection but will call me back because she can't get out of bed due to the kn pn and isn't able to put the brace on that you gave her  She will call me back to schedule the inj  When she is able to

## 2022-03-03 ENCOUNTER — APPOINTMENT (OUTPATIENT)
Dept: RADIOLOGY | Facility: CLINIC | Age: 67
End: 2022-03-03
Payer: MEDICARE

## 2022-03-03 ENCOUNTER — OFFICE VISIT (OUTPATIENT)
Dept: OBGYN CLINIC | Facility: CLINIC | Age: 67
End: 2022-03-03
Payer: MEDICARE

## 2022-03-03 VITALS
DIASTOLIC BLOOD PRESSURE: 81 MMHG | HEART RATE: 111 BPM | SYSTOLIC BLOOD PRESSURE: 139 MMHG | HEIGHT: 63 IN | BODY MASS INDEX: 29.59 KG/M2 | WEIGHT: 167 LBS

## 2022-03-03 DIAGNOSIS — M17.11 PRIMARY OSTEOARTHRITIS OF RIGHT KNEE: Primary | ICD-10-CM

## 2022-03-03 DIAGNOSIS — M17.11 PRIMARY OSTEOARTHRITIS OF RIGHT KNEE: ICD-10-CM

## 2022-03-03 DIAGNOSIS — M62.838 MUSCLE SPASM: ICD-10-CM

## 2022-03-03 DIAGNOSIS — M25.361 KNEE INSTABILITY, RIGHT: ICD-10-CM

## 2022-03-03 PROCEDURE — 73564 X-RAY EXAM KNEE 4 OR MORE: CPT

## 2022-03-03 PROCEDURE — 20610 DRAIN/INJ JOINT/BURSA W/O US: CPT | Performed by: FAMILY MEDICINE

## 2022-03-03 PROCEDURE — 99214 OFFICE O/P EST MOD 30 MIN: CPT | Performed by: FAMILY MEDICINE

## 2022-03-03 RX ORDER — LIDOCAINE HYDROCHLORIDE 10 MG/ML
2 INJECTION, SOLUTION INFILTRATION; PERINEURAL
Status: COMPLETED | OUTPATIENT
Start: 2022-03-03 | End: 2022-03-03

## 2022-03-03 RX ORDER — TRIAMCINOLONE ACETONIDE 40 MG/ML
20 INJECTION, SUSPENSION INTRA-ARTICULAR; INTRAMUSCULAR
Status: COMPLETED | OUTPATIENT
Start: 2022-03-03 | End: 2022-03-03

## 2022-03-03 RX ORDER — TRIAMCINOLONE ACETONIDE 40 MG/ML
40 INJECTION, SUSPENSION INTRA-ARTICULAR; INTRAMUSCULAR
Status: COMPLETED | OUTPATIENT
Start: 2022-03-03 | End: 2022-03-03

## 2022-03-03 RX ORDER — TIZANIDINE 4 MG/1
4 TABLET ORAL 2 TIMES DAILY PRN
Qty: 60 TABLET | Refills: 0 | Status: SHIPPED | OUTPATIENT
Start: 2022-03-03 | End: 2022-05-10

## 2022-03-03 RX ORDER — LIDOCAINE HYDROCHLORIDE 10 MG/ML
3 INJECTION, SOLUTION INFILTRATION; PERINEURAL
Status: COMPLETED | OUTPATIENT
Start: 2022-03-03 | End: 2022-03-03

## 2022-03-03 RX ORDER — BUPIVACAINE HYDROCHLORIDE 2.5 MG/ML
2 INJECTION, SOLUTION INFILTRATION; PERINEURAL
Status: COMPLETED | OUTPATIENT
Start: 2022-03-03 | End: 2022-03-03

## 2022-03-03 RX ADMIN — LIDOCAINE HYDROCHLORIDE 3 ML: 10 INJECTION, SOLUTION INFILTRATION; PERINEURAL at 11:12

## 2022-03-03 RX ADMIN — LIDOCAINE HYDROCHLORIDE 2 ML: 10 INJECTION, SOLUTION INFILTRATION; PERINEURAL at 11:12

## 2022-03-03 RX ADMIN — TRIAMCINOLONE ACETONIDE 40 MG: 40 INJECTION, SUSPENSION INTRA-ARTICULAR; INTRAMUSCULAR at 11:12

## 2022-03-03 RX ADMIN — TRIAMCINOLONE ACETONIDE 20 MG: 40 INJECTION, SUSPENSION INTRA-ARTICULAR; INTRAMUSCULAR at 11:12

## 2022-03-03 RX ADMIN — BUPIVACAINE HYDROCHLORIDE 2 ML: 2.5 INJECTION, SOLUTION INFILTRATION; PERINEURAL at 11:12

## 2022-03-03 NOTE — PROGRESS NOTES
Assessment/Plan:  Assessment/Plan   Diagnoses and all orders for this visit:    Primary osteoarthritis of right knee  -     XR knee 4+ vw right injury; Future  -     Medial  Knee Brace  -     Large joint arthrocentesis: R knee  -     Injection Procedure Prior Authorization; Future    Knee instability, right  -     Medial  Knee Brace    Muscle spasm  -     tiZANidine (ZANAFLEX) 4 mg tablet; Take 1 tablet (4 mg total) by mouth 2 (two) times a day as needed for muscle spasms        71-year-old female with osteoarthritis of right knee with right knee pain more than 1 week duration  Discussed with patient physical exam, radiographs, impression and plan  X-rays right knee noted for complete loss medial joint space with bone-on-bone  Physical exam right knee noted for swelling at the medial aspect  She has tenderness medial joint line  She has extension limited to -20°  There is laxity with valgus stress  Clinical impression is that she is symptomatic from degenerative changes of the knee  I discussed treatment regimen of corticosteroid injection,  brace, and Visco injection  I administered mixture of 2 cc 1% lidocaine, 2 cc 0 25% bupivacaine, and 1 5 cc Kenalog to the right knee without complication  I will refer her for medial  brace  We will request for one-shot Visco injection to the right knee  She will return for injection once approved  Subjective:   Patient ID: Justina Middleton is a 73 y o  female  Chief Complaint   Patient presents with    Right Knee - Follow-up, Pain, Swelling       71-year-old female with osteoarthritis of right knee following up for right knee pain more than 1 week duration  She was last seen by me 3 days ago which point she was prescribed meloxicam 15 mg once daily provided with hinged knee brace, and referred to physical therapy  She reports worsening of symptoms since last visit    She has pain described as localized to the medial aspect of knee, nonradiating, severe intensity, worse with bearing weight and ambulating, associated with swelling, and improved with resting  Knee Pain  This is a new problem  The current episode started in the past 7 days  The problem occurs daily  The problem has been gradually worsening  Associated symptoms include arthralgias and joint swelling  Pertinent negatives include no numbness or weakness  The symptoms are aggravated by standing, walking, twisting and bending  She has tried rest, NSAIDs, position changes and ice for the symptoms  The treatment provided mild relief  Review of Systems   Musculoskeletal: Positive for arthralgias and joint swelling  Neurological: Negative for weakness and numbness  Objective:  Vitals:    03/03/22 1023   BP: 139/81   Pulse: (!) 111   Weight: 75 8 kg (167 lb)   Height: 5' 3 25" (1 607 m)     Right Knee Exam     Tenderness   The patient is experiencing tenderness in the medial joint line  Range of Motion   Extension: -20     Tests   Valgus: positive    Other   Swelling: mild            Physical Exam  Vitals and nursing note reviewed  Constitutional:       General: She is not in acute distress  Appearance: She is well-developed  She is not ill-appearing or diaphoretic  HENT:      Head: Normocephalic  Right Ear: External ear normal       Left Ear: External ear normal    Eyes:      Conjunctiva/sclera: Conjunctivae normal    Neck:      Trachea: No tracheal deviation  Cardiovascular:      Comments: Tachycardic  Pulmonary:      Effort: Pulmonary effort is normal  No respiratory distress  Abdominal:      General: There is no distension  Musculoskeletal:         General: Swelling and tenderness present  No deformity or signs of injury  Skin:     General: Skin is warm and dry  Coloration: Skin is not jaundiced or pale  Neurological:      Mental Status: She is alert and oriented to person, place, and time     Psychiatric:         Mood and Affect: Mood normal          Behavior: Behavior normal          Thought Content: Thought content normal          Judgment: Judgment normal            Large joint arthrocentesis: R knee  Universal Protocol:  Consent: Verbal consent obtained  Risks and benefits: risks, benefits and alternatives were discussed  Consent given by: patient  Time out: Immediately prior to procedure a "time out" was called to verify the correct patient, procedure, equipment, support staff and site/side marked as required  Patient understanding: patient states understanding of the procedure being performed  Patient consent: the patient's understanding of the procedure matches consent given  Procedure consent: procedure consent matches procedure scheduled  Relevant documents: relevant documents present and verified  Test results: test results available and properly labeled  Site marked: the operative site was marked  Radiology Images displayed and confirmed   If images not available, report reviewed: imaging studies available  Required items: required blood products, implants, devices, and special equipment available  Patient identity confirmed: verbally with patient    Supporting Documentation  Indications: pain   Procedure Details  Location: knee - R knee  Preparation: Patient was prepped and draped in the usual sterile fashion  Needle gauge: 21G 2"  Ultrasound guidance: no  Approach: anteromedial  Medications administered: 2 mL bupivacaine 0 25 %; 2 mL lidocaine 1 %; 3 mL lidocaine 1 %; 40 mg triamcinolone acetonide 40 mg/mL; 20 mg triamcinolone acetonide 40 mg/mL    Patient tolerance: patient tolerated the procedure well with no immediate complications  Dressing:  Sterile dressing applied

## 2022-03-14 ENCOUNTER — TELEPHONE (OUTPATIENT)
Dept: OBGYN CLINIC | Facility: HOSPITAL | Age: 67
End: 2022-03-14

## 2022-03-14 NOTE — TELEPHONE ENCOUNTER
Dr Meryle Commons:  Meds question    Patient states she needs to get the T-dap in a couple days due to her daughter having a baby  Patient has a Visco injection appt on 3/25  Patient is asking if it is safe to get the Visco after she gets the T-dap?

## 2022-03-16 NOTE — TELEPHONE ENCOUNTER
Patient called back  Advised she received the voicemail but did not listen to it      Relayed message

## 2022-03-18 ENCOUNTER — TELEPHONE (OUTPATIENT)
Dept: OBGYN CLINIC | Facility: HOSPITAL | Age: 67
End: 2022-03-18

## 2022-03-21 ENCOUNTER — TELEPHONE (OUTPATIENT)
Dept: OBGYN CLINIC | Facility: HOSPITAL | Age: 67
End: 2022-03-21

## 2022-03-21 NOTE — TELEPHONE ENCOUNTER
Patient sees Dr Marjorie Lovelace  Patient is calling in stating that her right knee brace is falling down on her and she might just be putting it on wrong  Is she supposed to be putting this under her clothes? She is asking if someone is able to place it on for her or show her how to do it  The patient is scheduled to come into the office on 5/25 and did not know if it could be done then, please advise          Call back# [phone]

## 2022-03-25 ENCOUNTER — PROCEDURE VISIT (OUTPATIENT)
Dept: OBGYN CLINIC | Facility: CLINIC | Age: 67
End: 2022-03-25
Payer: MEDICARE

## 2022-03-25 VITALS
DIASTOLIC BLOOD PRESSURE: 75 MMHG | HEIGHT: 63 IN | WEIGHT: 169.4 LBS | BODY MASS INDEX: 30.02 KG/M2 | SYSTOLIC BLOOD PRESSURE: 119 MMHG | HEART RATE: 79 BPM

## 2022-03-25 DIAGNOSIS — M17.11 PRIMARY OSTEOARTHRITIS OF RIGHT KNEE: Primary | ICD-10-CM

## 2022-03-25 PROCEDURE — 20610 DRAIN/INJ JOINT/BURSA W/O US: CPT | Performed by: FAMILY MEDICINE

## 2022-03-25 RX ORDER — BUPIVACAINE HYDROCHLORIDE 2.5 MG/ML
1 INJECTION, SOLUTION INFILTRATION; PERINEURAL
Status: COMPLETED | OUTPATIENT
Start: 2022-03-25 | End: 2022-03-25

## 2022-03-25 RX ORDER — ALENDRONATE SODIUM 70 MG/1
70 TABLET ORAL WEEKLY
COMMUNITY
Start: 2022-01-25 | End: 2023-01-25

## 2022-03-25 RX ORDER — LIDOCAINE HYDROCHLORIDE 10 MG/ML
3 INJECTION, SOLUTION INFILTRATION; PERINEURAL
Status: COMPLETED | OUTPATIENT
Start: 2022-03-25 | End: 2022-03-25

## 2022-03-25 RX ORDER — ERTUGLIFLOZIN 15 MG/1
15 TABLET, FILM COATED ORAL DAILY
COMMUNITY
Start: 2021-12-14

## 2022-03-25 RX ADMIN — LIDOCAINE HYDROCHLORIDE 3 ML: 10 INJECTION, SOLUTION INFILTRATION; PERINEURAL at 10:20

## 2022-03-25 RX ADMIN — BUPIVACAINE HYDROCHLORIDE 1 ML: 2.5 INJECTION, SOLUTION INFILTRATION; PERINEURAL at 10:20

## 2022-03-25 NOTE — PROGRESS NOTES
Assessment/Plan:  Assessment/Plan   Diagnoses and all orders for this visit:    Primary osteoarthritis of right knee  -     Large joint arthrocentesis: R knee    Other orders  -     cyanocobalamin (VITAMIN B-12) 1000 MCG tablet; Take 1,000 mcg by mouth daily  -     Cholecalciferol 25 MCG (1000 UT) tablet; Take 1,000 Units by mouth daily  -     alendronate (FOSAMAX) 70 mg tablet; Take 70 mg by mouth Once a week  -     Ertugliflozin L-PyroglutamicAc (Steglatro) 15 MG TABS; Take 15 mg by mouth daily        72-year-old female with osteoarthritis of right knee with right knee pain more than 1 month duration  Clinical impression is that she is symptomatic from degenerative changes  She agreed to proceed with viscosupplementation  I administered Synvisc-One Visco supplement to the right knee without complication  She was advised to allow 3-4 weeks before she may notice full effects of Visco supplement  If Visco injection is to be repeated we must wait at least 6 months  She will follow up with me as needed  Subjective:   Patient ID: Rolo Sawant is a 73 y o  female  Chief Complaint   Patient presents with    Right Knee - Follow-up, Pain       72-year-old female with osteoarthritis of right knee following for right knee pain of more than 1 month duration  She was last seen by me 3 weeks ago which point she was provided for medial  brace, given corticosteroid injection, and we requested for viscosupplementation  She presents today for Visco injection of the right knee  She has been experiencing pain described as localized to the medial aspect knee, nonradiating, worse with bearing weight and ambulating, and improved with resting  Knee Pain  This is a new problem  The current episode started more than 1 month ago  The problem occurs daily  The problem has been unchanged  Associated symptoms include arthralgias  Pertinent negatives include no joint swelling, numbness or weakness   The symptoms are aggravated by standing and walking  She has tried rest and position changes (Corticosteroid injection) for the symptoms  The treatment provided mild relief  Review of Systems   Musculoskeletal: Positive for arthralgias  Negative for joint swelling  Neurological: Negative for weakness and numbness  Objective:  Vitals:    03/25/22 1002   BP: 119/75   Pulse: 79   Weight: 76 8 kg (169 lb 6 4 oz)   Height: 5' 3 25" (1 607 m)     Ortho Exam    Physical Exam      Large joint arthrocentesis: R knee  Universal Protocol:  Consent: Verbal consent obtained  Risks and benefits: risks, benefits and alternatives were discussed  Consent given by: patient  Time out: Immediately prior to procedure a "time out" was called to verify the correct patient, procedure, equipment, support staff and site/side marked as required  Patient understanding: patient states understanding of the procedure being performed  Patient consent: the patient's understanding of the procedure matches consent given  Procedure consent: procedure consent matches procedure scheduled  Relevant documents: relevant documents present and verified  Test results: test results available and properly labeled  Site marked: the operative site was marked  Radiology Images displayed and confirmed   If images not available, report reviewed: imaging studies available  Required items: required blood products, implants, devices, and special equipment available  Patient identity confirmed: verbally with patient    Supporting Documentation  Indications: pain   Procedure Details  Location: knee - R knee  Preparation: Patient was prepped and draped in the usual sterile fashion  Needle gauge: 21G 2"  Ultrasound guidance: no  Approach: anterolateral  Medications administered: 1 mL bupivacaine 0 25 %; 3 mL lidocaine 1 %; 48 mg hylan 48 MG/6ML    Patient tolerance: patient tolerated the procedure well with no immediate complications  Dressing:  Sterile dressing applied

## 2022-03-28 NOTE — PROGRESS NOTES
Assessment:  1  Chronic pain syndrome    2  Chronic bilateral low back pain without sciatica    3  Weakness of both lower extremities        Plan:  Orders Placed This Encounter   Procedures    Ambulatory referral to Physical Therapy     Standing Status:   Future     Standing Expiration Date:   3/29/2023     Referral Priority:   Routine     Referral Type:   Physical Therapy     Referral Reason:   Specialty Services Required     Requested Specialty:   Physical Therapy     Number of Visits Requested:   1     Expiration Date:   3/29/2023       New Medications Ordered This Visit   Medications    aspirin (Rl Aspirin EC Low Dose) 81 mg EC tablet     Sig: Take 81 mg by mouth daily    tolterodine (DETROL LA) 4 mg 24 hr capsule     Sig: Take 4 mg by mouth daily     My impressions and treatment recommendations were discussed in detail with the patient, who verbalized understanding and had no further questions  Given that the patient has signs and symptoms of low back pain and subjective bilateral lower extremity weakness, I felt a reasonable to have the patient undergo a course of physical therapy 2-3 times per week for 4-6 weeks  Follow-up is planned in 6 weeks time or sooner as warranted  Discharge instructions were provided  I personally saw and examined the patient and I agree with the above discussed plan of care  History of Present Illness:    Williams Verdugo is a 73 y o  female who presents to [facility] for initial evaluation of the above stated pain complaints  The patient has a past medical and chronic pain history as outlined in the assessment section  She was self-referred  The patient is reporting several months of pain in her low back  She describes her pain as moderate and 3/10 on the verbal numerical pain rating scale  Her pain is intermittent in nature without any typical pattern  She describes her pain as shooting, sharp, and throbbing    She is also reporting muscle spasms  She reports weakness in her lower extremities  She ambulates with the assistance of a cane at times  Relaxation decreases pain  Prayer, lying down, standing, bending, sitting, walking, and exercise increases pain  Lidocaine patch, acetaminophen, tizanidine, and paroxetine were used in the past     Review of Systems:    Review of Systems   Constitutional: Negative for fever and unexpected weight change  HENT: Negative for trouble swallowing  Eyes: Negative for visual disturbance  Respiratory: Negative for shortness of breath and wheezing  Cardiovascular: Negative for chest pain and palpitations  Gastrointestinal: Negative for constipation, diarrhea, nausea and vomiting  Endocrine: Negative for cold intolerance, heat intolerance and polydipsia  Genitourinary: Negative for difficulty urinating and frequency  Musculoskeletal: Negative for arthralgias, gait problem, joint swelling and myalgias  Skin: Negative for rash  Neurological: Negative for dizziness, seizures, syncope, weakness and headaches  Hematological: Does not bruise/bleed easily  Psychiatric/Behavioral: Positive for decreased concentration and dysphoric mood  The patient is nervous/anxious  All other systems reviewed and are negative          Patient Active Problem List   Diagnosis    MCI (mild cognitive impairment)       Past Medical History:   Diagnosis Date    Anxiety disorder     Benign essential hypertension     Depressive disorder     Diabetes mellitus with complication (HCC)     SANDI (generalized anxiety disorder)     GERD (gastroesophageal reflux disease)     Heart murmur     Insomnia     Knee pain, left     Lymphadenopathy     MCI (mild cognitive impairment)     Neck pain     Palpitations     Psoriasis vulgaris     Seborrheic keratosis     Secondary polycythemia     Vitamin D deficiency        Past Surgical History:   Procedure Laterality Date    BLADDER SURGERY         Family History Problem Relation Age of Onset    Diabetes Mother        Social History     Occupational History    Not on file   Tobacco Use    Smoking status: Former Smoker    Smokeless tobacco: Never Used   Vaping Use    Vaping Use: Never used   Substance and Sexual Activity    Alcohol use: No    Drug use: No    Sexual activity: Not on file         Current Outpatient Medications:     alendronate (FOSAMAX) 70 mg tablet, Take 70 mg by mouth Once a week, Disp: , Rfl:     ALPRAZolam ER (XANAX XR) 1 MG 24 hr tablet, Take 1 tablet by mouth 2 (two) times a day as needed, Disp: , Rfl:     aspirin (Rl Aspirin EC Low Dose) 81 mg EC tablet, Take 81 mg by mouth daily, Disp: , Rfl:     atorvastatin (LIPITOR) 10 mg tablet, Take 1 tablet by mouth daily, Disp: , Rfl:     Cholecalciferol 25 MCG (1000 UT) tablet, Take 1,000 Units by mouth daily, Disp: , Rfl:     cyanocobalamin (VITAMIN B-12) 1000 MCG tablet, Take 1,000 mcg by mouth daily, Disp: , Rfl:     Empagliflozin (Jardiance) 25 MG TABS, Take 25 mg by mouth daily  , Disp: , Rfl:     Ertugliflozin L-PyroglutamicAc (Steglatro) 15 MG TABS, Take 15 mg by mouth daily, Disp: , Rfl:     fluocinonide (LIDEX) 0 05 % cream, Apply topically 2 (two) times a day To psoriasis patches till resolve, Disp: 30 g, Rfl: 1    hydrocortisone 2 5 % cream, Apply topically 2 (two) times a day To psoriasis till clear, Disp: 20 g, Rfl: 1    insulin glargine (LANTUS) 100 units/mL subcutaneous injection, Inject under the skin, Disp: , Rfl:     losartan (COZAAR) 25 mg tablet, Take 1 tablet by mouth daily, Disp: , Rfl:     melatonin 1 mg, Take 10 mg by mouth daily at bedtime  , Disp: , Rfl:     metFORMIN (GLUCOPHAGE-XR) 500 mg 24 hr tablet, , Disp: , Rfl:     metFORMIN (GLUCOPHAGE-XR) 750 mg 24 hr tablet, Take by mouth, Disp: , Rfl:     metoprolol tartrate (LOPRESSOR) 25 mg tablet, Take 1 tablet by mouth daily, Disp: , Rfl:     PARoxetine (PAXIL) 20 mg tablet, every 24 hours, Disp: , Rfl:    tiZANidine (ZANAFLEX) 4 mg tablet, Take 1 tablet (4 mg total) by mouth 2 (two) times a day as needed for muscle spasms, Disp: 60 tablet, Rfl: 0    tolterodine (DETROL LA) 4 mg 24 hr capsule, Take 4 mg by mouth daily, Disp: , Rfl:     Allergies   Allergen Reactions    Acetaminophen Other (See Comments)    Escitalopram Hives    Hydrocodone-Acetaminophen Other (See Comments)    Liraglutide Hives and Vomiting    Penicillins Hives    Tuberculin Ppd Other (See Comments)    Latex Rash    Sulfa Antibiotics Rash       Physical Exam:    /75   Pulse 86   Resp 18   Ht 5' 3 25" (1 607 m)   Wt 73 9 kg (163 lb)   BMI 28 65 kg/m²     Constitutional: normal, well developed, well nourished, alert, in no distress and non-toxic and no overt pain behavior  Eyes: anicteric  HEENT: grossly intact  Neck: supple, symmetric, trachea midline and no masses   Pulmonary:even and unlabored  Cardiovascular:No edema or pitting edema present  Skin:Normal without rashes or lesions and well hydrated  Psychiatric:Mood and affect appropriate  Neurologic:Cranial Nerves II-XII grossly intact  Musculoskeletal:normal    Imaging  No orders to display   MRI Lumbar Spine   01/21/2022  MRI of the lumbar spine was performed on a 1 5 Irais magnet  The following   sequences were obtained: sagittal STIR, T1-weighted and T2-weighted sequences as   well as axial T2-weighted sequences were performed  Reference is made to prior plain films done 4/11/2006     Findings:     Please note for the purposes of this dictation it is assumed that the patient   has 5 lumbar-type vertebral bodies  Since the previous examination there is been loss of disc space height at L4-5   with development of grade 1 anterolisthesis of L4 on L5  There is abnormal low signal on all pulse sequences involving the left side of   the L4-5 endplates and right-sided L5-S1 endplates likely representing sclerotic   endplate changes       There is decreased T2-weighted signal in the L2-3 through L5-S1 intervertebral   discs consistent with desiccation  There is abnormal high STIR signal and low   T1-weighted signal involving the L2-3 endplates posteriorly consistent with type   I degenerative endplate changes  Conus medullaris terminates at the L1 level  At L1-2 there is minimal disc bulging  At L2-3 there is mild disc bulging with some bilateral neural foraminal   stenosis  At L3-4 there is a broad-based disc bulge/herniation which effaces the ventral   thecal sac  There is mild ligamentum flavum thickening degenerative change   facets  There is mild right and more mild to moderate left neural foraminal   stenosis  There is mild central canal stenosis  At L4-5 there is the aforementioned anterolisthesis with degenerative changes of   the facets and posterior disc osteophyte complex  There is moderate central   canal stenosis with severe left and more moderate right neural foraminal   stenosis as well as subarticular recess stenosis bilaterally  At L5-S1 there is a posterior disc osteophyte complex paracentral to right with   degenerative change facets  There is severe bilateral neural foraminal stenosis   with mild central canal stenosis  IMPRESSION:   IMPRESSION:     Multilevel degenerative changes of the lumbar spine with development of grade 1   anterolisthesis of L4 on L5 with degenerative changes of the facets and right   greater than left neural foraminal stenosis  There is also severe left and more   moderate right neural foraminal stenosis at L5-S1 as discussed above         Orders Placed This Encounter   Procedures    Ambulatory referral to Physical Therapy

## 2022-03-29 ENCOUNTER — CONSULT (OUTPATIENT)
Dept: PAIN MEDICINE | Facility: CLINIC | Age: 67
End: 2022-03-29
Payer: MEDICARE

## 2022-03-29 VITALS
HEIGHT: 63 IN | BODY MASS INDEX: 28.88 KG/M2 | DIASTOLIC BLOOD PRESSURE: 75 MMHG | WEIGHT: 163 LBS | SYSTOLIC BLOOD PRESSURE: 122 MMHG | HEART RATE: 86 BPM | RESPIRATION RATE: 18 BRPM

## 2022-03-29 DIAGNOSIS — G89.29 CHRONIC BILATERAL LOW BACK PAIN WITHOUT SCIATICA: ICD-10-CM

## 2022-03-29 DIAGNOSIS — M54.50 CHRONIC BILATERAL LOW BACK PAIN WITHOUT SCIATICA: ICD-10-CM

## 2022-03-29 DIAGNOSIS — R29.898 WEAKNESS OF BOTH LOWER EXTREMITIES: ICD-10-CM

## 2022-03-29 DIAGNOSIS — G89.4 CHRONIC PAIN SYNDROME: Primary | ICD-10-CM

## 2022-03-29 PROCEDURE — 99214 OFFICE O/P EST MOD 30 MIN: CPT | Performed by: ANESTHESIOLOGY

## 2022-03-29 RX ORDER — TOLTERODINE 4 MG/1
4 CAPSULE, EXTENDED RELEASE ORAL DAILY
COMMUNITY

## 2022-03-29 RX ORDER — ASPIRIN 81 MG/1
81 TABLET ORAL DAILY
COMMUNITY

## 2022-03-29 NOTE — PATIENT INSTRUCTIONS
Core Strengthening Exercises   WHAT YOU NEED TO KNOW:   What do I need to know about core strengthening exercises? Core strengthening exercises help heal and strengthen muscles of your hips, back, and abdomen to prevent reinjury  They are beginning exercises to help support your spine  Ask your healthcare provider if you need to see a physical therapist for more advanced exercises  · Do the exercises on a mat or firm surface  (not on a bed) to support your spine and avoid low back pain  · Do the exercises in the same order every time  to train your muscles to work together  Your healthcare provider will show you how to perform these exercises  Do them every day, or as directed by your healthcare provider  · Move slowly and smoothly  Avoid fast or jerky motions  · Stop if you feel pain  It is normal to feel some discomfort at first  Regular exercise will help decrease your discomfort over time  How do I perform core strengthening exercises safely? Hold each exercise for 5 seconds  When you can do the exercise without pain for 5 seconds, increase your hold to 10 to 15 seconds  When you can do the exercise without pain for 10 to 15 seconds, add the next exercise  Increase the time you hold each exercise, or repeat the exercises as directed  As you do each exercise, breathe normally  Do not hold your breath  · Abdominal bracing:  Lie on your back with your knees bent and feet flat on the floor  Place your arms in a relaxed position beside your body  Pull your belly button in toward your spine  Do not flatten or arch your back  Tighten the abdominal muscles below your belly button  Hold for 5 seconds  Begin all of your exercises with abdominal bracing  You can also practice abdominal bracing throughout the day while you are sitting or standing  · Bridging:  Lie on your back with your knees bent and feet flat on the floor  Rest your arms at your side   Tighten your buttocks, and then lift your hips 1 inch off the floor  Hold for 5 seconds  When you can do this exercise without pain for 10 seconds, increase the distance you lift your hips  A good goal is to be able to lift your hips so that your shoulders, hips, and knees are in a straight line  · Curl up:  Lie on your back with your knees bent and feet flat on the floor  Place your hands, palms down, underneath the curve in your lower back  Next, with your elbows on the floor, lift your shoulders and chest 2 to 3 inches  Keep your head in line with your shoulders  Hold this position for 5 seconds  When you can do this exercise without pain for 10 to 15 seconds, you may add a rotation  While your shoulders and chest are lifted off the ground, turn slightly to the left and hold  Repeat on the other side  · Dead bug:  Lie on your back with your knees bent and feet flat on the floor  Place your arms in a relaxed position beside your body  Begin with abdominal bracing  Next, raise one leg, keeping your knee bent  Hold for 5 seconds  Repeat with the other leg  When you can do this exercise without pain for 10 to 15 seconds, you may raise one straight leg and hold  Repeat with the other leg  · Quadruped:  Place your hands and knees on the floor  Keep your wrists directly below your shoulders and your knees directly below your hips  Pull your belly button in toward your spine  Do not flatten or arch your back  Tighten your abdominal muscles below your belly button  Hold for 5 seconds  When you can do this exercise without pain for 10 to 15 seconds, you may extend one arm and hold  Repeat on the other side  · Side Bridge:      ¨ Standing side bridge:  Stand next to a wall and extend one arm toward the wall  Place your palm flat on the wall with your fingers pointing upward  Begin with abdominal bracing  Next, without moving your feet, slowly bend your arm to 90 degrees  Hold for 5 seconds  Repeat on the other side   When you can do this exercise without pain for 10 to 15 seconds, you may do the bent leg side bridge on the floor  ¨ Bent leg side bridge:  Lie on one side with your legs, hips, and shoulders in a straight line  Prop yourself up onto your forearm so your elbow is directly below your shoulder  Bend your knees back to 90 degrees  Begin with abdominal bracing  Next, lift your hips and balance yourself on your forearm and knees  Hold for 5 seconds  Repeat on the other side  When you can do this exercise without pain for 10 to 15 seconds, you may do the straight leg side bridge on the floor  ¨ Straight leg side bridge:  Lie on one side with your legs, hips, and shoulders in a straight line  Prop yourself up onto your forearm so your elbow is directly below your shoulder  Begin with abdominal bracing  Lift your hips off the floor and balance yourself on your forearm and the outside of your flexed foot  Do not let your ankle bend sideways  Hold for 5 seconds  Repeat on the other side  When you can do this exercise without pain for 10 to 15 seconds, ask your healthcare provider for more advanced exercises  When should I contact my healthcare provider? · Your pain becomes worse  · You have new pain  · You have questions or concerns about your condition, care, or exercise program   CARE AGREEMENT:   You have the right to help plan your care  Learn about your health condition and how it may be treated  Discuss treatment options with your caregivers to decide what care you want to receive  You always have the right to refuse treatment  The above information is an  only  It is not intended as medical advice for individual conditions or treatments  Talk to your doctor, nurse or pharmacist before following any medical regimen to see if it is safe and effective for you    © 2016 [address] is for End User's use only and may not be sold, redistributed or otherwise used for commercial purposes  All illustrations and images included in CareNotes® are the copyrighted property of [employer]  or Devin Joseph  Lower Back Exercises   WHAT YOU NEED TO KNOW:   What do I need to know about lower back exercises? Lower back exercises help heal and strengthen your back muscles to prevent another injury  Ask your healthcare provider if you need to see a physical therapist for more advanced exercises  · Do the exercises on a mat or firm surface  (not on a bed) to support your spine and prevent low back pain  · Move slowly and smoothly  Avoid fast or jerky motions  · Breathe normally  Do not hold your breath  · Stop if you feel pain  It is normal to feel some discomfort at first  Regular exercise will help decrease your discomfort over time  How do I perform lower back exercises safely? Your healthcare provider may recommend that you do back exercises 10 to 30 minutes each day  He may also recommend that you do exercises 1 to 3 times each day  Ask your healthcare provider which exercises are best for you and how often to do them  · Ankle pumps:  Lie on your back  Move your foot up (with your toes pointing toward your head)  Then, move your foot down (with your toes pointing away from you)  Repeat this exercise 10 times on each side  · Heel slides:  Lie on your back  Slowly bend one leg and then straighten it  Next, bend the other leg and then straighten it  Repeat 10 times on each side  · Pelvic tilt:  Lie on your back with your knees bent and feet flat on the floor  Place your arms in a relaxed position beside your body  Tighten the muscles of your abdomen and flatten your back against the floor  Hold for 5 seconds  Repeat 5 times  · Back stretch:  Lie on your back with your hands behind your head  Bend your knees and turn the lower half of your body to one side  Hold this position for 10 seconds  Repeat 3 times on each side  · Straight leg raises:  Lie on your back with one leg straight  Bend the other knee  Tighten your abdomen and then slowly lift the straight leg up about 6 to 12 inches off the floor  Hold for 1 to 5 seconds  Lower your leg slowly  Repeat 10 times on each leg  · Knee-to-chest:  Lie on your back with your knees bent and feet flat on the floor  Pull one of your knees toward your chest and hold it there for 5 seconds  Return your leg to the starting position  Lift the other knee toward your chest and hold for 5 seconds  Do this 5 times on each side  · Cat and camel:  Place your hands and knees on the floor  Arch your back upward toward the ceiling and lower your head  Round out your spine as much as you can  Hold for 5 seconds  Lift your head upward and push your chest downward toward the floor  Hold for 5 seconds  Do 3 sets or as directed  · Wall squats:  Stand with your back against a wall  Tighten the muscles of your abdomen  Slowly lower your body until your knees are bent at a 45 degree angle  Hold this position for 5 seconds  Slowly move back up to a standing position  Repeat 10 times  · Curl up:  Lie on your back with your knees bent and feet flat on the floor  Place your hands, palms down, underneath the curve in your lower back  Next, with your elbows on the floor, lift your shoulders and chest 2 to 3 inches  Keep your head in line with your shoulders  Hold this position for 5 seconds  When you can do this exercise without pain for 10 to 15 seconds, you may add a rotation  While your shoulders and chest are lifted off the ground, turn slightly to the left and hold  Repeat on the other side  · Bird dog:  Place your hands and knees on the floor  Keep your wrists directly below your shoulders and your knees directly below your hips  Pull your belly button in toward your spine  Do not flatten or arch your back  Tighten your abdominal muscles   Raise one arm straight out so that it is aligned with your head  Next, raise the leg opposite your arm  Hold this position for 15 seconds  Lower your arm and leg slowly and change sides  Do 5 sets  When should I seek immediate care? · You have severe pain that prevents you from moving  When should I contact my healthcare provider? · Your pain becomes worse  · You have new pain  · You have questions or concerns about your condition or care  CARE AGREEMENT:   You have the right to help plan your care  Learn about your health condition and how it may be treated  Discuss treatment options with your healthcare providers to decide what care you want to receive  You always have the right to refuse treatment  The above information is an  only  It is not intended as medical advice for individual conditions or treatments  Talk to your doctor, nurse or pharmacist before following any medical regimen to see if it is safe and effective for you  © Copyright [employer] 2022 Information is for End User's use only and may not be sold, redistributed or otherwise used for commercial purposes   All illustrations and images included in CareNotes® are the copyrighted property of [employer]  or [address] [place] [address]

## 2022-04-18 ENCOUNTER — TELEPHONE (OUTPATIENT)
Dept: OBGYN CLINIC | Facility: HOSPITAL | Age: 67
End: 2022-04-18

## 2022-04-18 NOTE — TELEPHONE ENCOUNTER
Dr Heath Crocker    [phone]    Patient is requesting a new order for PT for her right knee  Patient will call back with fax #

## 2022-04-19 DIAGNOSIS — R29.898 WEAKNESS OF BOTH HIPS: ICD-10-CM

## 2022-04-19 DIAGNOSIS — M62.9 HAMSTRING TIGHTNESS OF BOTH LOWER EXTREMITIES: ICD-10-CM

## 2022-04-19 DIAGNOSIS — M17.11 PRIMARY OSTEOARTHRITIS OF RIGHT KNEE: Primary | ICD-10-CM

## 2022-04-19 DIAGNOSIS — S76.311D HAMSTRING STRAIN, RIGHT, SUBSEQUENT ENCOUNTER: ICD-10-CM

## 2022-04-19 DIAGNOSIS — M25.361 KNEE INSTABILITY, RIGHT: ICD-10-CM

## 2022-05-06 NOTE — PROGRESS NOTES
Pain Medicine Follow-Up Note    Assessment:  1  Chronic pain syndrome    2  Chronic bilateral low back pain with bilateral sciatica    3  Lumbar radiculopathy        Plan:  My impressions and treatment recommendations were discussed in detail with the patient who verbalized understanding and had no further questions  The patient is reporting pain primarily in the low back that seems to bother her in the evenings  At this point, I felt a reasonable to have her trial taking tizanidine 4 mg 1 tablet up to 3 times daily as needed for muscle spasms  She previously used tizanidine and states that it was helpful for her  Side effects were reviewed with the patient  The patient would also like to continue physical therapy and I encouraged her to continue physical therapy at this time  If she does continue to respond to physical therapy, there is no need for any interventional pain procedures, but if she plateaus or no longer response to physical therapy, I will consider an epidural steroid injection for her  Follow-up is planned in 4 weeks time or sooner as warranted  Discharge instructions were provided  I personally saw and examined the patient and I agree with the above discussed plan of care  History of Present Illness:    Jamie Talley is a 73 y o  female who presents to [facility] for interval re-evaluation of the above stated pain complaints  The patient has a past medical and chronic pain history as outlined in the assessment section  She was last seen on March 29, 2022  At today's office visit, the patient's pain score is 4 5/10 on the verbal numerical pain rating scale  The patient states that her symptoms are primarily in the low back  She describes her pain as worse in the evening and night  Her pain is constant in nature  She reports the quality of her pain as sharp, shooting, and comes on suddenly    She is reporting that physical therapy is helping her considerably  She is asking for something to help her in the evenings since she is getting sharp pain around 19:00 every day  Other than as stated above, the patient denies any interval changes in medications, medical condition, mental condition, symptoms, or allergies since the last office visit  Review of Systems:    Review of Systems   Respiratory: Negative for shortness of breath  Cardiovascular: Negative for chest pain  Gastrointestinal: Negative for constipation, diarrhea, nausea and vomiting  Musculoskeletal: Positive for gait problem  Negative for arthralgias, joint swelling and myalgias  Skin: Negative for rash  Neurological: Negative for dizziness, seizures and weakness  All other systems reviewed and are negative          Patient Active Problem List   Diagnosis    MCI (mild cognitive impairment)       Past Medical History:   Diagnosis Date    Anxiety disorder     Benign essential hypertension     Depressive disorder     Diabetes mellitus with complication (HCC)     SANDI (generalized anxiety disorder)     GERD (gastroesophageal reflux disease)     Heart murmur     Insomnia     Knee pain, left     Lymphadenopathy     MCI (mild cognitive impairment)     Neck pain     Palpitations     Psoriasis vulgaris     Seborrheic keratosis     Secondary polycythemia     Vitamin D deficiency        Past Surgical History:   Procedure Laterality Date    BLADDER SURGERY         Family History   Problem Relation Age of Onset    Diabetes Mother        Social History     Occupational History    Not on file   Tobacco Use    Smoking status: Former Smoker    Smokeless tobacco: Never Used   Vaping Use    Vaping Use: Never used   Substance and Sexual Activity    Alcohol use: No    Drug use: No    Sexual activity: Not on file         Current Outpatient Medications:     alendronate (FOSAMAX) 70 mg tablet, Take 70 mg by mouth Once a week, Disp: , Rfl:     ALPRAZolam ER (XANAX XR) 1 MG 24 hr tablet, Take 1 tablet by mouth 2 (two) times a day as needed, Disp: , Rfl:     aspirin (Rl Aspirin EC Low Dose) 81 mg EC tablet, Take 81 mg by mouth daily, Disp: , Rfl:     atorvastatin (LIPITOR) 10 mg tablet, Take 1 tablet by mouth daily, Disp: , Rfl:     Cholecalciferol 25 MCG (1000 UT) tablet, Take 1,000 Units by mouth daily, Disp: , Rfl:     cyanocobalamin (VITAMIN B-12) 1000 MCG tablet, Take 1,000 mcg by mouth daily, Disp: , Rfl:     fluocinonide (LIDEX) 0 05 % cream, Apply topically 2 (two) times a day To psoriasis patches till resolve, Disp: 30 g, Rfl: 1    hydrocortisone 2 5 % cream, Apply topically 2 (two) times a day To psoriasis till clear, Disp: 20 g, Rfl: 1    insulin glargine (LANTUS) 100 units/mL subcutaneous injection, Inject under the skin, Disp: , Rfl:     losartan (COZAAR) 25 mg tablet, Take 1 tablet by mouth daily, Disp: , Rfl:     melatonin 1 mg, Take 10 mg by mouth daily at bedtime  , Disp: , Rfl:     metFORMIN (GLUCOPHAGE-XR) 500 mg 24 hr tablet, , Disp: , Rfl:     metFORMIN (GLUCOPHAGE-XR) 750 mg 24 hr tablet, Take by mouth, Disp: , Rfl:     metoprolol tartrate (LOPRESSOR) 25 mg tablet, Take 1 tablet by mouth daily, Disp: , Rfl:     PARoxetine (PAXIL) 20 mg tablet, every 24 hours, Disp: , Rfl:     tiZANidine (ZANAFLEX) 4 mg tablet, Take 1 tablet (4 mg total) by mouth 2 (two) times a day as needed for muscle spasms, Disp: 60 tablet, Rfl: 0    tolterodine (DETROL LA) 4 mg 24 hr capsule, Take 4 mg by mouth daily, Disp: , Rfl:     Empagliflozin (Jardiance) 25 MG TABS, Take 25 mg by mouth daily   (Patient not taking: Reported on 5/10/2022 ), Disp: , Rfl:     Ertugliflozin L-PyroglutamicAc (Steglatro) 15 MG TABS, Take 15 mg by mouth daily, Disp: , Rfl:     Allergies   Allergen Reactions    Acetaminophen Other (See Comments)    Escitalopram Hives    Hydrocodone-Acetaminophen Other (See Comments)    Liraglutide Hives and Vomiting    Penicillins Hives    Tuberculin Ppd Other (See Comments)    Latex Rash    Sulfa Antibiotics Rash       Physical Exam:    /71   Pulse 77   Resp 18   Ht 5' 3 25" (1 607 m)   Wt 77 1 kg (170 lb)   BMI 29 88 kg/m²     Constitutional:normal, well developed, well nourished, alert, in no distress and non-toxic and no overt pain behavior    Eyes:anicteric  HEENT:grossly intact  Neck:supple, symmetric, trachea midline and no masses   Pulmonary:even and unlabored  Cardiovascular:No edema or pitting edema present  Skin:Normal without rashes or lesions and well hydrated  Psychiatric:Mood and affect appropriate  Neurologic:Cranial Nerves II-XII grossly intact  Musculoskeletal:normal

## 2022-05-10 ENCOUNTER — OFFICE VISIT (OUTPATIENT)
Dept: PAIN MEDICINE | Facility: CLINIC | Age: 67
End: 2022-05-10
Payer: MEDICARE

## 2022-05-10 VITALS
HEIGHT: 63 IN | RESPIRATION RATE: 18 BRPM | SYSTOLIC BLOOD PRESSURE: 127 MMHG | HEART RATE: 77 BPM | DIASTOLIC BLOOD PRESSURE: 71 MMHG | BODY MASS INDEX: 30.12 KG/M2 | WEIGHT: 170 LBS

## 2022-05-10 DIAGNOSIS — M54.41 CHRONIC BILATERAL LOW BACK PAIN WITH BILATERAL SCIATICA: ICD-10-CM

## 2022-05-10 DIAGNOSIS — G89.4 CHRONIC PAIN SYNDROME: Primary | ICD-10-CM

## 2022-05-10 DIAGNOSIS — M54.16 LUMBAR RADICULOPATHY: ICD-10-CM

## 2022-05-10 DIAGNOSIS — G89.29 CHRONIC BILATERAL LOW BACK PAIN WITH BILATERAL SCIATICA: ICD-10-CM

## 2022-05-10 DIAGNOSIS — M54.42 CHRONIC BILATERAL LOW BACK PAIN WITH BILATERAL SCIATICA: ICD-10-CM

## 2022-05-10 PROCEDURE — 99214 OFFICE O/P EST MOD 30 MIN: CPT | Performed by: ANESTHESIOLOGY

## 2022-05-10 RX ORDER — TIZANIDINE 4 MG/1
4 TABLET ORAL 3 TIMES DAILY PRN
Qty: 90 TABLET | Refills: 0 | Status: SHIPPED | OUTPATIENT
Start: 2022-05-10

## 2022-05-18 ENCOUNTER — TELEPHONE (OUTPATIENT)
Dept: GASTROENTEROLOGY | Facility: CLINIC | Age: 67
End: 2022-05-18

## 2022-05-18 NOTE — TELEPHONE ENCOUNTER
Patient LMOM that she wanted to confirm appt for Thursday, 5/12  RTRN'd call to let patient know she does not have an appt with us and to call back with any questions to [phone], option 1    Thx

## 2022-09-06 ENCOUNTER — TELEPHONE (OUTPATIENT)
Dept: OTHER | Facility: OTHER | Age: 67
End: 2022-09-06

## 2022-09-06 NOTE — TELEPHONE ENCOUNTER
Patient is calling regarding cancelling an appointment      Date/Time: 9/6/22 07:30      Patient was rescheduled: YES [] NO [x]    Patient requesting call back to reschedule: YES [x] NO []

## 2022-09-13 NOTE — PROGRESS NOTES
Pain Medicine Follow-Up Note    Assessment:  1  Chronic pain syndrome    2  Chronic bilateral low back pain with bilateral sciatica    3  Lumbar radiculopathy        Plan:  Orders Placed This Encounter   Procedures    Ambulatory referral to Physical Therapy     Standing Status:   Future     Standing Expiration Date:   9/14/2023     Referral Priority:   Routine     Referral Type:   Physical Therapy     Referral Reason:   Specialty Services Required     Requested Specialty:   Physical Therapy     Number of Visits Requested:   1     Expiration Date:   9/14/2023     My impressions and treatment recommendations were discussed in detail with the patient who verbalized understanding and had no further questions  The patient is reporting pain on the right side of her low back with radiation into the right lower extremity in what appears to be the right S1 distribution  She does have some tenderness noted over the right sacroiliac joint, but RAJEEV testing, thigh thrust testing, and sacroiliac compression testing is negative on the right  She does not have any signs of piriformis syndrome on my examination  At this point, I did feel it reasonable to have the patient undergo a course of physical therapy 2-3 times per week 4-6 weeks  If she does not respond to the physical therapy, I will obtain a MRI of the lumbar spine to evaluate for any pathology that may be contributing to her pain complaints  She has responded to physical therapy on several occasions in the past     Follow-up is planned in 6 weeks time or sooner as warranted  Discharge instructions were provided  I personally saw and examined the patient and I agree with the above discussed plan of care  History of Present Illness:    Kamilla Andrade is a 73 y o  female who presents to [facility] for interval re-evaluation of the above stated pain complaints   The patient has a past medical and chronic pain history as outlined in the assessment section  She was last seen on May 10, 2022  At today's office visit, the patient's pain score is 5/10 on the verbal numerical pain rating scale  The patient states that her pain is worse in the morning  Her pain is intermittent in nature  She reports the quality of her pain as sharp and throbbing  She reports that her symptoms are primarily involving her right buttocks region with radiation down the posterior aspect of her right lower extremity to about the level of her right knee  Other than as stated above, the patient denies any interval changes in medications, medical condition, mental condition, symptoms, or allergies since the last office visit  Review of Systems:    Review of Systems   Respiratory: Negative for shortness of breath  Cardiovascular: Negative for chest pain  Gastrointestinal: Negative for constipation, diarrhea, nausea and vomiting  Musculoskeletal: Positive for arthralgias and gait problem  Negative for joint swelling and myalgias  Decreased ROM    Skin: Negative for rash  Neurological: Negative for dizziness, seizures and weakness  All other systems reviewed and are negative          Patient Active Problem List   Diagnosis    MCI (mild cognitive impairment)       Past Medical History:   Diagnosis Date    Anxiety disorder     Benign essential hypertension     Depressive disorder     Diabetes mellitus with complication (HCC)     SANDI (generalized anxiety disorder)     GERD (gastroesophageal reflux disease)     Heart murmur     Insomnia     Knee pain, left     Lymphadenopathy     MCI (mild cognitive impairment)     Neck pain     Palpitations     Psoriasis vulgaris     Seborrheic keratosis     Secondary polycythemia     Vitamin D deficiency        Past Surgical History:   Procedure Laterality Date    BLADDER SURGERY         Family History   Problem Relation Age of Onset    Diabetes Mother        Social History     Occupational History    Not on file   Tobacco Use    Smoking status: Former Smoker    Smokeless tobacco: Never Used   Vaping Use    Vaping Use: Never used   Substance and Sexual Activity    Alcohol use: No    Drug use: No    Sexual activity: Not on file         Current Outpatient Medications:     alendronate (FOSAMAX) 70 mg tablet, Take 70 mg by mouth Once a week, Disp: , Rfl:     ALPRAZolam ER (XANAX XR) 1 MG 24 hr tablet, Take 1 tablet by mouth 2 (two) times a day as needed, Disp: , Rfl:     aspirin (ECOTRIN LOW STRENGTH) 81 mg EC tablet, Take 81 mg by mouth daily, Disp: , Rfl:     atorvastatin (LIPITOR) 10 mg tablet, Take 1 tablet by mouth daily, Disp: , Rfl:     Cholecalciferol 25 MCG (1000 UT) tablet, Take 1,000 Units by mouth daily, Disp: , Rfl:     cyanocobalamin (VITAMIN B-12) 1000 MCG tablet, Take 1,000 mcg by mouth daily, Disp: , Rfl:     Ertugliflozin L-PyroglutamicAc (Steglatro) 15 MG TABS, Take 15 mg by mouth daily, Disp: , Rfl:     fluocinonide (LIDEX) 0 05 % cream, Apply topically 2 (two) times a day To psoriasis patches till resolve, Disp: 30 g, Rfl: 1    hydrocortisone 2 5 % cream, Apply topically 2 (two) times a day To psoriasis till clear, Disp: 20 g, Rfl: 1    insulin glargine (LANTUS) 100 units/mL subcutaneous injection, Inject under the skin, Disp: , Rfl:     losartan (COZAAR) 25 mg tablet, Take 1 tablet by mouth daily, Disp: , Rfl:     melatonin 1 mg, Take 10 mg by mouth daily at bedtime  , Disp: , Rfl:     metFORMIN (GLUCOPHAGE-XR) 750 mg 24 hr tablet, Take by mouth, Disp: , Rfl:     metoprolol tartrate (LOPRESSOR) 25 mg tablet, Take 1 tablet by mouth daily, Disp: , Rfl:     PARoxetine (PAXIL) 20 mg tablet, every 24 hours, Disp: , Rfl:     tiZANidine (ZANAFLEX) 4 mg tablet, Take 1 tablet (4 mg total) by mouth 3 (three) times a day as needed for muscle spasms, Disp: 90 tablet, Rfl: 0    tolterodine (DETROL LA) 4 mg 24 hr capsule, Take 4 mg by mouth daily, Disp: , Rfl:    Empagliflozin (Jardiance) 25 MG TABS, Take 25 mg by mouth daily   (Patient not taking: No sig reported), Disp: , Rfl:     metFORMIN (GLUCOPHAGE-XR) 500 mg 24 hr tablet, , Disp: , Rfl:     Allergies   Allergen Reactions    Acetaminophen Other (See Comments)    Escitalopram Hives    Hydrocodone-Acetaminophen Other (See Comments)    Liraglutide Hives and Vomiting    Penicillins Hives    Tuberculin Ppd Other (See Comments)    Latex Rash    Sulfa Antibiotics Rash       Physical Exam:    /81 (BP Location: Left arm, Patient Position: Sitting, Cuff Size: Standard)   Wt 73 8 kg (162 lb 9 6 oz)   BMI 28 58 kg/m²     Constitutional:normal, well developed, well nourished, alert, in no distress and non-toxic and no overt pain behavior  Eyes:anicteric  HEENT:grossly intact  Neck:supple, symmetric, trachea midline and no masses   Pulmonary:even and unlabored  Cardiovascular:No edema or pitting edema present  Skin:Normal without rashes or lesions and well hydrated  Psychiatric:Mood and affect appropriate  Neurologic:Cranial Nerves II-XII grossly intact  Musculoskeletal:antalgic, tenderness noted over the right sacroiliac joint    RAJEEV testing is positive on the right and negative on the left    Orders Placed This Encounter   Procedures    Ambulatory referral to Physical Therapy

## 2022-09-14 ENCOUNTER — OFFICE VISIT (OUTPATIENT)
Dept: PAIN MEDICINE | Facility: CLINIC | Age: 67
End: 2022-09-14
Payer: MEDICARE

## 2022-09-14 VITALS — DIASTOLIC BLOOD PRESSURE: 81 MMHG | WEIGHT: 162.6 LBS | SYSTOLIC BLOOD PRESSURE: 134 MMHG | BODY MASS INDEX: 28.58 KG/M2

## 2022-09-14 DIAGNOSIS — M54.41 CHRONIC BILATERAL LOW BACK PAIN WITH BILATERAL SCIATICA: ICD-10-CM

## 2022-09-14 DIAGNOSIS — M54.42 CHRONIC BILATERAL LOW BACK PAIN WITH BILATERAL SCIATICA: ICD-10-CM

## 2022-09-14 DIAGNOSIS — G89.29 CHRONIC BILATERAL LOW BACK PAIN WITH BILATERAL SCIATICA: ICD-10-CM

## 2022-09-14 DIAGNOSIS — G89.4 CHRONIC PAIN SYNDROME: Primary | ICD-10-CM

## 2022-09-14 DIAGNOSIS — M54.16 LUMBAR RADICULOPATHY: ICD-10-CM

## 2022-09-14 PROCEDURE — 99214 OFFICE O/P EST MOD 30 MIN: CPT | Performed by: ANESTHESIOLOGY

## 2022-09-14 NOTE — PATIENT INSTRUCTIONS
Core Strengthening Exercises   WHAT YOU NEED TO KNOW:   What do I need to know about core strengthening exercises? Your core includes the muscles of your lower back, hip, pelvis, and abdomen  Core strengthening exercises help heal and strengthen these muscles  This helps prevent another injury, and keeps your pelvis, spine, and hips in the correct position  What do I need to know about exercise safety? Talk to your healthcare provider before you start an exercise program  A physical therapist can teach you how to do core strengthening exercises safely  Do the exercises on a mat or firm surface  A firm surface will support your spine and prevent low back pain  Do not do these exercises on a bed  Move slowly and smoothly  Avoid fast or jerky motions  Stop if you feel pain  Core exercises should not be painful  Stop if you feel pain  Breathe normally during core exercises  Do not hold your breath  This may cause an increase in blood pressure and prevent muscle strengthening  Your healthcare provider will tell you when to inhale and exhale during the exercise  Begin all of your exercises with abdominal bracing  Abdominal bracing helps warm up your core muscles  You can also practice abdominal bracing throughout the day  Lie on your back with your knees bent and feet flat on the floor  Place your arms in a relaxed position beside your body  Tighten your abdominal muscles  Pull your belly button in and up toward your spine  Hold for 5 seconds  Relax your muscles  Repeat 10 times  How do I perform core strengthening exercises? Your healthcare provider will tell you how often to do these exercises  The provider will also tell you how many repetitions of each exercise you should do  Hold each exercise for 5 seconds or as directed  As you get stronger, increase your hold to 10 to 15 seconds  You can do some of these exercises on a stability ball, or with a weight   Ask your healthcare provider how to use a stability ball or weight for these exercises:  Bridging:  Lie on your back with your knees bent and feet flat on the floor  Rest your arms at your side  Tighten your buttocks, and then lift your hips 1 inch off the floor  Hold for 5 seconds  When you can do this exercise without pain for 10 seconds, increase the distance you lift your hips  A good goal is to be able to lift your hips so that your shoulders, hips, and knees are in a straight line  Dead bug:  Lie on your back with your knees bent and feet flat on the floor  Place your arms in a relaxed position beside your body  Begin with abdominal bracing  Next, raise one leg, keeping your knee bent  Hold for 5 seconds  Repeat with the other leg  When you can do this exercise without pain for 10 to 15 seconds, you may raise one straight leg and hold  Repeat with the other leg  Quadruped:  Place your hands and knees on the floor  Keep your wrists directly below your shoulders and your knees directly below your hips  Pull your belly button in toward your spine  Do not flatten or arch your back  Tighten your abdominal muscles below your belly button  Hold for 5 seconds  When you can do this exercise without pain for 10 to 15 seconds, you may extend one arm and hold  Repeat on the other side  Side bridge exercises:      Standing side bridge:  Stand next to a wall and extend one arm toward the wall  Place your palm flat on the wall with your fingers pointing upward  Begin with abdominal bracing  Next, without moving your feet, slowly bend your arm to 90 degrees  Hold for 5 seconds  Repeat on the other side  When you can do this exercise without pain for 10 to 15 seconds, you may do the bent leg side bridge on the floor  Bent leg side bridge:  Lie on one side with your legs, hips, and shoulders in a straight line  Prop yourself up onto your forearm so your elbow is directly below your shoulder   Bend your knees back to 90 degrees  Begin with abdominal bracing  Next, lift your hips and balance yourself on your forearm and knees  Hold for 5 seconds  Repeat on the other side  When you can do this exercise without pain for 10 to 15 seconds, you may do the straight leg side bridge on the floor  Straight leg side bridge:  Lie on one side with your legs, hips, and shoulders in a straight line  Prop yourself up onto your forearm so your elbow is directly below your shoulder  Begin with abdominal bracing  Lift your hips off the floor and balance yourself on your forearm and the outside of your flexed foot  Do not let your ankle bend sideways  Hold for 5 seconds  Repeat on the other side  When you can do this exercise without pain for 10 to 15 seconds, ask your healthcare provider for more advanced exercises  Superman:  Lie on your stomach  Extend your arms forward on the floor  Tighten your abdominal muscles and lift your right hand and left leg off the floor  Hold this position  Slowly return to the starting position  Tighten your abdominal muscles and lift your left hand and right leg off the floor  Hold this position  Slowly return to the starting position  Clam:  Lie on your side with your knees bent  Put your bottom arm under your head to keep your neck in line  Put your top hand on your hip to keep your pelvis from moving  Put your heels together, and keep them together during this exercise  Slowly raise your top knee toward the ceiling  Then lower your leg so your knees are together  Repeat this exercise 10 times  Then switch sides and do the exercise 10 times with the other leg  Curl up:  Lie on your back with your knees bent and feet flat on the floor  Place your hands, palms down, underneath your lower back  Next, with your elbows on the floor, lift your shoulders and chest 2 to 3 inches off the floor  Keep your head in line with your shoulders  Hold this position   Slowly return to the starting position  Straight leg raises:  Lie on your back with one leg straight  Bend the other knee and place your foot flat on the floor  Tighten your abdominal muscles  Keep your leg straight and slowly lift it straight up 6 to 12 inches off the floor  Hold this position  Lower your leg slowly  Do as many repetitions as directed on this side  Repeat with the other leg  Plank:  Lie on your stomach  Bend your elbows and place your forearms flat on the floor  Lift your chest, stomach, and knees off the floor  Make sure your elbows are below your shoulders  Your body should be in a straight line  Do not let your hips or lower back sink to the ground  Squeeze your abdominal muscles together and hold for 15 seconds  To make this exercise harder, hold for 30 seconds or lift 1 leg at a time  Bicycles:  Lie on your back  Bend both knees and bring them toward your chest  Your calves should be parallel to the floor  Place the palms of your hands on the back of your head  Straighten your right leg and keep it lifted 2 inches off the floor  Raise your head and shoulders off the floor and twist towards your left  Keep your head and shoulders lifted  Bend your right knee while you straighten your left leg  Keep your left leg 2 inches off the floor  Twist your head and chest towards the left leg  Continue to straighten 1 leg at a time and twist        When should I contact my healthcare provider? You have sharp or worsening pain during exercise or at rest     You have questions or concerns about your condition, care, or exercise program     CARE AGREEMENT:   You have the right to help plan your care  Learn about your health condition and how it may be treated  Discuss treatment options with your healthcare providers to decide what care you want to receive  You always have the right to refuse treatment  The above information is an  only   It is not intended as medical advice for individual conditions or treatments  Talk to your doctor, nurse or pharmacist before following any medical regimen to see if it is safe and effective for you  © Copyright [employer] 2022 Information is for End User's use only and may not be sold, redistributed or otherwise used for commercial purposes  All illustrations and images included in CareNotes® are the copyrighted property of [employer]  or [employer]      Lower Back Exercises   WHAT YOU NEED TO KNOW:   What do I need to know about lower back exercises? Lower back exercises help heal and strengthen your back muscles to prevent another injury  Ask your healthcare provider if you need to see a physical therapist for more advanced exercises  Do the exercises on a mat or firm surface  (not on a bed) to support your spine and prevent low back pain  Move slowly and smoothly  Avoid fast or jerky motions  Breathe normally  Do not hold your breath  Stop if you feel pain  It is normal to feel some discomfort at first  Regular exercise will help decrease your discomfort over time  How do I perform lower back exercises safely? Your healthcare provider may recommend that you do back exercises 10 to 30 minutes each day  He may also recommend that you do exercises 1 to 3 times each day  Ask your healthcare provider which exercises are best for you and how often to do them  Ankle pumps:  Lie on your back  Move your foot up (with your toes pointing toward your head)  Then, move your foot down (with your toes pointing away from you)  Repeat this exercise 10 times on each side  Heel slides:  Lie on your back  Slowly bend one leg and then straighten it  Next, bend the other leg and then straighten it  Repeat 10 times on each side  Pelvic tilt:  Lie on your back with your knees bent and feet flat on the floor  Place your arms in a relaxed position beside your body   Tighten the muscles of your abdomen and flatten your back against the floor  Hold for 5 seconds  Repeat 5 times  Back stretch:  Lie on your back with your hands behind your head  Bend your knees and turn the lower half of your body to one side  Hold this position for 10 seconds  Repeat 3 times on each side  Straight leg raises:  Lie on your back with one leg straight  Bend the other knee  Tighten your abdomen and then slowly lift the straight leg up about 6 to 12 inches off the floor  Hold for 1 to 5 seconds  Lower your leg slowly  Repeat 10 times on each leg  Knee-to-chest:  Lie on your back with your knees bent and feet flat on the floor  Pull one of your knees toward your chest and hold it there for 5 seconds  Return your leg to the starting position  Lift the other knee toward your chest and hold for 5 seconds  Do this 5 times on each side  Cat and camel:  Place your hands and knees on the floor  Arch your back upward toward the ceiling and lower your head  Round out your spine as much as you can  Hold for 5 seconds  Lift your head upward and push your chest downward toward the floor  Hold for 5 seconds  Do 3 sets or as directed  Wall squats:  Stand with your back against a wall  Tighten the muscles of your abdomen  Slowly lower your body until your knees are bent at a 45 degree angle  Hold this position for 5 seconds  Slowly move back up to a standing position  Repeat 10 times  Curl up:  Lie on your back with your knees bent and feet flat on the floor  Place your hands, palms down, underneath the curve in your lower back  Next, with your elbows on the floor, lift your shoulders and chest 2 to 3 inches  Keep your head in line with your shoulders  Hold this position for 5 seconds  When you can do this exercise without pain for 10 to 15 seconds, you may add a rotation  While your shoulders and chest are lifted off the ground, turn slightly to the left and hold  Repeat on the other side           Bird dog:  Place your hands and knees on the floor  Keep your wrists directly below your shoulders and your knees directly below your hips  Pull your belly button in toward your spine  Do not flatten or arch your back  Tighten your abdominal muscles  Raise one arm straight out so that it is aligned with your head  Next, raise the leg opposite your arm  Hold this position for 15 seconds  Lower your arm and leg slowly and change sides  Do 5 sets  When should I seek immediate care? You have severe pain that prevents you from moving  When should I contact my healthcare provider? Your pain becomes worse  You have new pain  You have questions or concerns about your condition or care  CARE AGREEMENT:   You have the right to help plan your care  Learn about your health condition and how it may be treated  Discuss treatment options with your healthcare providers to decide what care you want to receive  You always have the right to refuse treatment  The above information is an  only  It is not intended as medical advice for individual conditions or treatments  Talk to your doctor, nurse or pharmacist before following any medical regimen to see if it is safe and effective for you  © Copyright [employer] 2022 Information is for End User's use only and may not be sold, redistributed or otherwise used for commercial purposes   All illustrations and images included in CareNotes® are the copyrighted property of A [employer] A M , Inc  or [address] [place] [address] [employer]

## 2022-10-03 ENCOUNTER — TELEPHONE (OUTPATIENT)
Dept: PAIN MEDICINE | Facility: CLINIC | Age: 67
End: 2022-10-03

## 2022-10-03 DIAGNOSIS — M54.41 CHRONIC BILATERAL LOW BACK PAIN WITH BILATERAL SCIATICA: ICD-10-CM

## 2022-10-03 DIAGNOSIS — G89.4 CHRONIC PAIN SYNDROME: ICD-10-CM

## 2022-10-03 DIAGNOSIS — G89.29 CHRONIC BILATERAL LOW BACK PAIN WITH BILATERAL SCIATICA: ICD-10-CM

## 2022-10-03 DIAGNOSIS — M54.16 LUMBAR RADICULOPATHY: ICD-10-CM

## 2022-10-03 DIAGNOSIS — M54.42 CHRONIC BILATERAL LOW BACK PAIN WITH BILATERAL SCIATICA: ICD-10-CM

## 2022-10-03 NOTE — TELEPHONE ENCOUNTER
Several attempts made   "Number is not a working number "  Sent [employer] message at this time  Will try again by phone tomorrow

## 2022-10-03 NOTE — TELEPHONE ENCOUNTER
Patient called asking for an increased dosage of the medication for muscle spasms     pls use Hayden Chamberlain on file- thank you        [phone]

## 2022-10-05 ENCOUNTER — TELEPHONE (OUTPATIENT)
Dept: PAIN MEDICINE | Facility: CLINIC | Age: 67
End: 2022-10-05

## 2022-10-05 RX ORDER — TIZANIDINE 4 MG/1
4 TABLET ORAL 4 TIMES DAILY PRN
Qty: 120 TABLET | Refills: 0 | Status: SHIPPED | OUTPATIENT
Start: 2022-10-05

## 2022-10-05 NOTE — TELEPHONE ENCOUNTER
QID PRN dosing of tizanidine sent to the pharmacy  Please inform patient  Also, please set up a follow up visit in 4 weeks with myself or MG to re-evaluate symptoms as well as dosage

## 2022-10-05 NOTE — TELEPHONE ENCOUNTER
Pt called and was sent a message to her leela from  to nurse [facility] yesterday  Today pt came into office to ask if her meds can be increased or if she can take more per day Tizanidine 4mg  Pt states she is still having a lot of pain  Please advise  Also pt is having trouble with her phone send message to leela if possible

## 2022-10-05 NOTE — TELEPHONE ENCOUNTER
S/w pt  States tizanidine helps but does not last that long  Pt is taking three times a day without side effects  Pt asking if she can take an extra dose at night as pain is worse overnight  Also agreeable to alternative if med can not be increased    Pt uses [facility] [address] if a script is sent

## 2022-10-05 NOTE — TELEPHONE ENCOUNTER
Vivienne Sebastian MD  [facility] 5 minutes ago (10:30 AM)             Please reach out to patient regarding this  Documentation     Liz Brown routed conversation to Vivienne Sebastian MD 11 minutes ago (10:24 AM)    [place] Marciano 11 minutes ago (10:24 AM)    KG         Pt called and was sent a message to her [employer] from  to nurse [facility] yesterday  Today pt came into office to ask if her meds can be increased or if she can take more per day Tizanidine 4mg  Pt states she is still having a lot of pain  Please advise  Also pt is having trouble with her phone send message to [employer] if possible

## 2022-10-25 NOTE — PROGRESS NOTES
Pain Medicine Follow-Up Note    Assessment:  1  Chronic pain syndrome    2  Chronic right-sided low back pain with right-sided sciatica    3  Lumbar disc herniation with radiculopathy    4  Spinal stenosis of lumbar region with neurogenic claudication        Plan:  Orders Placed This Encounter   Procedures   • Ambulatory referral to Physical Therapy     Standing Status:   Future     Standing Expiration Date:   10/26/2023     Referral Priority:   Routine     Referral Type:   Physical Therapy     Referral Reason:   Specialty Services Required     Requested Specialty:   Physical Therapy     Number of Visits Requested:   1     Expiration Date:   10/26/2023       New Medications Ordered This Visit   Medications   • atorvastatin (LIPITOR) 20 mg tablet   • sertraline (ZOLOFT) 25 mg tablet   • temazepam (RESTORIL) 15 mg capsule       My impressions and treatment recommendations were discussed in detail with the patient who verbalized understanding and had no further questions  Given that the patient has signs and symptoms of low back pain with radiation into the right groin in the context of a L3-L4 disc herniation seen on a MRI of the lumbar spine from January 2022, I felt a reasonable to offer the patient a right L3 and L4 transforaminal epidural steroid injection since this could be potentially therapeutic  The procedures, its risks, and benefits were explained in detail to the patient  Risks include but are not limited to bleeding, infection, hematoma formation, abscess formation, weakness, headache, failure the pain to improve, nerve irritation or damage, and potential worsening of the pain  The patient verbalized understanding and wished to hold off with the procedure at this time  She would prefer to continue physical therapy to see if that continues to improve her overall pain since it did help considerably over the last several weeks  Follow-up is planned in 4 weeks time or sooner as warranted  Discharge instructions were provided  I personally saw and examined the patient and I agree with the above discussed plan of care  History of Present Illness:    Annalise Molina is a 78 y o  female who presents to [facility] for interval re-evaluation of the above stated pain complaints  The patient has a past medical and chronic pain history as outlined in the assessment section  She was last seen on September 14, 2022  At today's office visit, the patient's pain score is 6/10 on the verbal numerical pain rating scale  The patient states that her symptoms are primarily in the low back with radiation into the right groin  She describes her pain as worse in the evening and night  Her pain is intermittent in nature  She reports the quality of her pain as sharp and shooting  She is reporting 40% relief of symptoms with the combination of the tizanidine I had prescribed as previous office visit as well as physical therapy  She has been using the tizanidine very sparingly and only when she is having muscle spasms  Other than as stated above, the patient denies any interval changes in medications, medical condition, mental condition, symptoms, or allergies since the last office visit  Review of Systems:    Review of Systems   Respiratory: Negative for shortness of breath  Cardiovascular: Negative for chest pain  Gastrointestinal: Negative for constipation, diarrhea, nausea and vomiting  Musculoskeletal: Positive for gait problem  Negative for arthralgias, joint swelling and myalgias  Pain in groin area   Skin: Negative for rash  Neurological: Negative for dizziness, seizures and weakness  All other systems reviewed and are negative          Patient Active Problem List   Diagnosis   • MCI (mild cognitive impairment)       Past Medical History:   Diagnosis Date   • Anxiety disorder    • Benign essential hypertension    • Depressive disorder    • Diabetes mellitus with complication (HCC)    • SANDI (generalized anxiety disorder)    • GERD (gastroesophageal reflux disease)    • Heart murmur    • Insomnia    • Knee pain, left    • Lymphadenopathy    • MCI (mild cognitive impairment)    • Neck pain    • Palpitations    • Psoriasis vulgaris    • Seborrheic keratosis    • Secondary polycythemia    • Vitamin D deficiency        Past Surgical History:   Procedure Laterality Date   • BLADDER SURGERY         Family History   Problem Relation Age of Onset   • Diabetes Mother        Social History     Occupational History   • Not on file   Tobacco Use   • Smoking status: Former Smoker   • Smokeless tobacco: Never Used   Vaping Use   • Vaping Use: Never used   Substance and Sexual Activity   • Alcohol use: No   • Drug use: No   • Sexual activity: Not on file         Current Outpatient Medications:   •  alendronate (FOSAMAX) 70 mg tablet, Take 70 mg by mouth Once a week, Disp: , Rfl:   •  ALPRAZolam ER (XANAX XR) 1 MG 24 hr tablet, Take 1 tablet by mouth 2 (two) times a day as needed, Disp: , Rfl:   •  aspirin (ECOTRIN LOW STRENGTH) 81 mg EC tablet, Take 81 mg by mouth daily, Disp: , Rfl:   •  atorvastatin (LIPITOR) 10 mg tablet, Take 1 tablet by mouth daily, Disp: , Rfl:   •  atorvastatin (LIPITOR) 20 mg tablet, , Disp: , Rfl:   •  Cholecalciferol 25 MCG (1000 UT) tablet, Take 1,000 Units by mouth daily, Disp: , Rfl:   •  cyanocobalamin (VITAMIN B-12) 1000 MCG tablet, Take 1,000 mcg by mouth daily, Disp: , Rfl:   •  Ertugliflozin L-PyroglutamicAc (Steglatro) 15 MG TABS, Take 15 mg by mouth daily, Disp: , Rfl:   •  fluocinonide (LIDEX) 0 05 % cream, Apply topically 2 (two) times a day To psoriasis patches till resolve, Disp: 30 g, Rfl: 1  •  hydrocortisone 2 5 % cream, Apply topically 2 (two) times a day To psoriasis till clear, Disp: 20 g, Rfl: 1  •  insulin glargine (LANTUS) 100 units/mL subcutaneous injection, Inject under the skin, Disp: , Rfl:   •  losartan (COZAAR) 25 mg tablet, Take 1 tablet by mouth daily, Disp: , Rfl:   •  melatonin 1 mg, Take 10 mg by mouth daily at bedtime  , Disp: , Rfl:   •  metFORMIN (GLUCOPHAGE-XR) 750 mg 24 hr tablet, Take by mouth, Disp: , Rfl:   •  metoprolol tartrate (LOPRESSOR) 25 mg tablet, Take 1 tablet by mouth daily, Disp: , Rfl:   •  PARoxetine (PAXIL) 20 mg tablet, every 24 hours, Disp: , Rfl:   •  sertraline (ZOLOFT) 25 mg tablet, , Disp: , Rfl:   •  temazepam (RESTORIL) 15 mg capsule, , Disp: , Rfl:   •  tiZANidine (ZANAFLEX) 4 mg tablet, Take 1 tablet (4 mg total) by mouth 4 (four) times a day as needed for muscle spasms, Disp: 120 tablet, Rfl: 0  •  tolterodine (DETROL LA) 4 mg 24 hr capsule, Take 4 mg by mouth daily, Disp: , Rfl:   •  Empagliflozin (Jardiance) 25 MG TABS, Take 25 mg by mouth daily   (Patient not taking: No sig reported), Disp: , Rfl:   •  metFORMIN (GLUCOPHAGE-XR) 500 mg 24 hr tablet, , Disp: , Rfl:     Allergies   Allergen Reactions   • Acetaminophen Other (See Comments)   • Escitalopram Hives   • Hydrocodone-Acetaminophen Other (See Comments)   • Liraglutide Hives and Vomiting   • Penicillins Hives   • Tuberculin Ppd Other (See Comments)   • Latex Rash   • Sulfa Antibiotics Rash       Physical Exam:    /83 (BP Location: Left arm, Patient Position: Sitting, Cuff Size: Standard)   Pulse 84   Ht 5' 3 25" (1 607 m)   Wt 70 7 kg (155 lb 12 8 oz)   BMI 27 38 kg/m²     Constitutional:normal, well developed, well nourished, alert, in no distress and non-toxic and no overt pain behavior    Eyes:anicteric  HEENT:grossly intact  Neck:supple, symmetric, trachea midline and no masses   Pulmonary:even and unlabored  Cardiovascular:No edema or pitting edema present  Skin:Normal without rashes or lesions and well hydrated  Psychiatric:Mood and affect appropriate  Neurologic:Cranial Nerves II-XII grossly intact  Musculoskeletal:normal    Orders Placed This Encounter   Procedures   • Ambulatory referral to Physical Therapy

## 2022-10-26 ENCOUNTER — OFFICE VISIT (OUTPATIENT)
Dept: PAIN MEDICINE | Facility: CLINIC | Age: 67
End: 2022-10-26
Payer: MEDICARE

## 2022-10-26 VITALS
HEIGHT: 63 IN | HEART RATE: 84 BPM | SYSTOLIC BLOOD PRESSURE: 137 MMHG | BODY MASS INDEX: 27.61 KG/M2 | WEIGHT: 155.8 LBS | DIASTOLIC BLOOD PRESSURE: 83 MMHG

## 2022-10-26 DIAGNOSIS — G89.29 CHRONIC RIGHT-SIDED LOW BACK PAIN WITH RIGHT-SIDED SCIATICA: ICD-10-CM

## 2022-10-26 DIAGNOSIS — G89.4 CHRONIC PAIN SYNDROME: Primary | ICD-10-CM

## 2022-10-26 DIAGNOSIS — M51.16 LUMBAR DISC HERNIATION WITH RADICULOPATHY: ICD-10-CM

## 2022-10-26 DIAGNOSIS — M54.41 CHRONIC RIGHT-SIDED LOW BACK PAIN WITH RIGHT-SIDED SCIATICA: ICD-10-CM

## 2022-10-26 DIAGNOSIS — M48.062 SPINAL STENOSIS OF LUMBAR REGION WITH NEUROGENIC CLAUDICATION: ICD-10-CM

## 2022-10-26 PROCEDURE — 99214 OFFICE O/P EST MOD 30 MIN: CPT | Performed by: ANESTHESIOLOGY

## 2022-10-26 RX ORDER — TEMAZEPAM 15 MG/1
CAPSULE ORAL
COMMUNITY
Start: 2022-10-11

## 2022-10-26 RX ORDER — SERTRALINE HYDROCHLORIDE 25 MG/1
TABLET, FILM COATED ORAL
COMMUNITY
Start: 2022-10-18

## 2022-10-26 RX ORDER — ATORVASTATIN CALCIUM 20 MG/1
TABLET, FILM COATED ORAL
COMMUNITY
Start: 2022-10-19

## 2023-01-19 ENCOUNTER — TELEPHONE (OUTPATIENT)
Dept: PAIN MEDICINE | Facility: CLINIC | Age: 68
End: 2023-01-19

## 2023-01-19 NOTE — TELEPHONE ENCOUNTER
Caller: Pt    Doctor: Cheri Cuello    Reason for call: Pt said that she has to hold off of going to physical therapy right now  She has to deal some some other health issue  She will call back when she can start physical therapy      Call back#: [phone]

## 2023-03-17 ENCOUNTER — TELEPHONE (OUTPATIENT)
Dept: PAIN MEDICINE | Facility: MEDICAL CENTER | Age: 68
End: 2023-03-17

## 2023-03-17 NOTE — TELEPHONE ENCOUNTER
S/w the patient and she stated she started with increasing pain yesterday in her LB and she feel like she is having spasms more on the right side  She stated she did take a tizanidine at 530 am with no real relief  Inquired if she has tried tylenol or what does she normally take for the pain and she stated she doesn't really take anything  Encouraged rest, ice vs heat for 20 minutes on and off  Reviewed that it would not affect her DM  She did make an ovs for f/u  Encouraged her to seek treatment at the ED if the pain goes above a 10  She denies B & B problems currently  WS to advise otherwise   Thanks

## 2023-03-17 NOTE — TELEPHONE ENCOUNTER
Caller: patient    Doctor: shilpi    Reason for call: patient is having sever pain with spasm and took a tizanidine at 5a and is not helping with the pain      [facility] # [place], [address]  [facility] [phone]        Call back#:

## 2023-03-17 NOTE — TELEPHONE ENCOUNTER
Caller: patient    Doctor: shilpi    Reason for call: patient is scheduled for 3/30, would like something for pain while she waits for her appt    Call back#:

## 2023-03-17 NOTE — TELEPHONE ENCOUNTER
Attempted to call the patient and left a detailed mom in regards to scheduling an OVS to be evaluated

## 2023-06-29 ENCOUNTER — OFFICE VISIT (OUTPATIENT)
Dept: OBGYN CLINIC | Facility: CLINIC | Age: 68
End: 2023-06-29
Payer: MEDICARE

## 2023-06-29 ENCOUNTER — APPOINTMENT (OUTPATIENT)
Dept: RADIOLOGY | Facility: CLINIC | Age: 68
End: 2023-06-29
Payer: MEDICARE

## 2023-06-29 VITALS
OXYGEN SATURATION: 97 % | SYSTOLIC BLOOD PRESSURE: 159 MMHG | BODY MASS INDEX: 27.71 KG/M2 | HEIGHT: 63 IN | WEIGHT: 156.38 LBS | DIASTOLIC BLOOD PRESSURE: 71 MMHG | HEART RATE: 91 BPM

## 2023-06-29 DIAGNOSIS — M79.642 BILATERAL HAND PAIN: ICD-10-CM

## 2023-06-29 DIAGNOSIS — M25.532 BILATERAL WRIST PAIN: ICD-10-CM

## 2023-06-29 DIAGNOSIS — M79.641 BILATERAL HAND PAIN: ICD-10-CM

## 2023-06-29 DIAGNOSIS — M25.531 BILATERAL WRIST PAIN: ICD-10-CM

## 2023-06-29 DIAGNOSIS — M18.0 ARTHRITIS OF CARPOMETACARPAL (CMC) JOINT OF BOTH THUMBS: Primary | ICD-10-CM

## 2023-06-29 PROCEDURE — 99214 OFFICE O/P EST MOD 30 MIN: CPT | Performed by: FAMILY MEDICINE

## 2023-06-29 PROCEDURE — 73130 X-RAY EXAM OF HAND: CPT

## 2023-06-29 RX ORDER — NAPROXEN 500 MG/1
500 TABLET ORAL 2 TIMES DAILY WITH MEALS
Qty: 60 TABLET | Refills: 0 | Status: SHIPPED | OUTPATIENT
Start: 2023-06-29

## 2023-06-29 NOTE — PROGRESS NOTES
Assessment/Plan:    No problem-specific Assessment & Plan notes found for this encounter  Diagnoses and all orders for this visit:    Arthritis of carpometacarpal (CMC) joint of both thumbs  -     Durable Medical Equipment  -     naproxen (Naprosyn) 500 mg tablet; Take 1 tablet (500 mg total) by mouth 2 (two) times a day with meals  -     Ambulatory Referral to Occupational Therapy; Future    Bilateral hand pain  -     Cancel: XR wrist 3+ vw right; Future  -     Cancel: XR wrist 3+ vw left; Future  -     naproxen (Naprosyn) 500 mg tablet; Take 1 tablet (500 mg total) by mouth 2 (two) times a day with meals  -     Ambulatory Referral to Occupational Therapy; Future  -     Sedimentation rate, automated; Future  -     C-reactive protein; Future  -     CBC and differential; Future  -     Rheumatoid Factor; Future        Radiographs of bilateral hands were obtained in office today which revealed degenerative changes of the [facility] joint bilaterally andPIP/ DIP joint of the 2nd-4th digits of the right hand  Advanced degenerative changes were noted in the left [facility] in comparison to the contralateral side  Clinical impression is that patient is symptomatic from underlying [facility] arthritis most affecting her left hand  She does have notable arthritis in the PIP and DIP of the right hand digits and is notable soft tissue swelling along these joints  I am recommending patient begin with [facility] brace for bilateral hands, we will initiate physical therapy and begin patient on naproxen 500 mg twice daily with food  She is advised to stop if she experiences any side effects with the medication  Additionally given notable swelling in the small joints we will evaluate with blood work for inflammatory markers for possible rheumatoid arthritis  She will follow-up with me again in about 6 to 8 weeks for reevaluation    Can consider injection at follow-up visit if no improvement    Subjective:      Patient ID: Gab Wills is a 78 "y o  female is presenting today for initial evaluation of bilateral hand pain  Patient states that the pain symptoms have been ongoing for a while however she reports with the right hand burning her ring finger on the stove few weeks ago and has been having pain in the fourth and third digits of the right hand since this injury  The pain is predominant over the PIP middle phalanx and DIP of the third and fourth digits of the right hand  She denies any fevers or chills and denies any open wounds during this injury  Additionally she is reporting left hand pain which is most prominent at the base of the thumb  She does use her hands frequently at work and throughout the day    She has not tried anything for this pain up until this time    HPI    The following portions of the patient's history were reviewed and updated as appropriate: allergies, current medications, past family history, past medical history, past social history, past surgical history and problem list     Review of Systems      Objective:      /71   Pulse 91   Ht 5' 3 25\" (1 607 m)   Wt 70 9 kg (156 lb 6 oz)   SpO2 97%   BMI 27 48 kg/m²          Physical Exam      Right hand  There is no gross abnormality there is slight swelling noted at the DIP of the third and fourth digits along with the PIPs of the 2nd-5th digits  There is no redness or warmth  There is no tenderness to palpation in the entirety of the hand including the carpal metacarpal or phalangeal bones  She does have some tenderness to palpation at the first [facility] and CMC grind is positive    Left hand  Positive CMC grind  No gross abnormality no open wounds skin changes redness or warmth      "

## 2023-06-30 ENCOUNTER — APPOINTMENT (OUTPATIENT)
Dept: LAB | Facility: CLINIC | Age: 68
End: 2023-06-30
Payer: MEDICARE

## 2023-06-30 DIAGNOSIS — M79.642 BILATERAL HAND PAIN: ICD-10-CM

## 2023-06-30 DIAGNOSIS — M79.641 BILATERAL HAND PAIN: ICD-10-CM

## 2023-06-30 LAB
BASOPHILS # BLD AUTO: 0.11 THOUSANDS/ÂΜL (ref 0–0.1)
BASOPHILS NFR BLD AUTO: 1 % (ref 0–1)
CRP SERPL QL: 6 MG/L
EOSINOPHIL # BLD AUTO: 0.14 THOUSAND/ÂΜL (ref 0–0.61)
EOSINOPHIL NFR BLD AUTO: 1 % (ref 0–6)
ERYTHROCYTE [DISTWIDTH] IN BLOOD BY AUTOMATED COUNT: 13.4 % (ref 11.6–15.1)
ERYTHROCYTE [SEDIMENTATION RATE] IN BLOOD: 26 MM/HOUR (ref 0–29)
HCT VFR BLD AUTO: 45.4 % (ref 34.8–46.1)
HGB BLD-MCNC: 15.4 G/DL (ref 11.5–15.4)
IMM GRANULOCYTES # BLD AUTO: 0.04 THOUSAND/UL (ref 0–0.2)
IMM GRANULOCYTES NFR BLD AUTO: 0 % (ref 0–2)
LYMPHOCYTES # BLD AUTO: 2.14 THOUSANDS/ÂΜL (ref 0.6–4.47)
LYMPHOCYTES NFR BLD AUTO: 20 % (ref 14–44)
MCH RBC QN AUTO: 29.8 PG (ref 26.8–34.3)
MCHC RBC AUTO-ENTMCNC: 33.9 G/DL (ref 31.4–37.4)
MCV RBC AUTO: 88 FL (ref 82–98)
MONOCYTES # BLD AUTO: 0.65 THOUSAND/ÂΜL (ref 0.17–1.22)
MONOCYTES NFR BLD AUTO: 6 % (ref 4–12)
NEUTROPHILS # BLD AUTO: 7.48 THOUSANDS/ÂΜL (ref 1.85–7.62)
NEUTS SEG NFR BLD AUTO: 72 % (ref 43–75)
NRBC BLD AUTO-RTO: 0 /100 WBCS
PLATELET # BLD AUTO: 172 THOUSANDS/UL (ref 149–390)
PMV BLD AUTO: 12.5 FL (ref 8.9–12.7)
RBC # BLD AUTO: 5.16 MILLION/UL (ref 3.81–5.12)
WBC # BLD AUTO: 10.56 THOUSAND/UL (ref 4.31–10.16)

## 2023-06-30 PROCEDURE — 85025 COMPLETE CBC W/AUTO DIFF WBC: CPT

## 2023-06-30 PROCEDURE — 85652 RBC SED RATE AUTOMATED: CPT

## 2023-06-30 PROCEDURE — 36415 COLL VENOUS BLD VENIPUNCTURE: CPT

## 2023-06-30 PROCEDURE — 86430 RHEUMATOID FACTOR TEST QUAL: CPT

## 2023-06-30 PROCEDURE — 86140 C-REACTIVE PROTEIN: CPT

## 2023-07-01 LAB — RHEUMATOID FACT SER QL LA: NEGATIVE

## 2023-07-06 ENCOUNTER — TELEPHONE (OUTPATIENT)
Dept: OBGYN CLINIC | Facility: CLINIC | Age: 68
End: 2023-07-06

## 2023-07-06 NOTE — TELEPHONE ENCOUNTER
----- Message from Casper Perez DO sent at 7/5/2023 11:34 AM EDT -----  Please notify patient that blood work tests were normal.

## 2023-07-06 NOTE — TELEPHONE ENCOUNTER
Call to this patient to relay Dr Sabirna Bhandari message. She understood and had no further questions at this time.

## 2023-07-23 DIAGNOSIS — M79.641 BILATERAL HAND PAIN: ICD-10-CM

## 2023-07-23 DIAGNOSIS — M18.0 ARTHRITIS OF CARPOMETACARPAL (CMC) JOINT OF BOTH THUMBS: ICD-10-CM

## 2023-07-23 DIAGNOSIS — M79.642 BILATERAL HAND PAIN: ICD-10-CM

## 2023-07-24 RX ORDER — NAPROXEN 500 MG/1
500 TABLET ORAL 2 TIMES DAILY WITH MEALS
Qty: 60 TABLET | Refills: 0 | Status: SHIPPED | OUTPATIENT
Start: 2023-07-24

## 2023-08-18 DIAGNOSIS — M79.642 BILATERAL HAND PAIN: ICD-10-CM

## 2023-08-18 DIAGNOSIS — M18.0 ARTHRITIS OF CARPOMETACARPAL (CMC) JOINT OF BOTH THUMBS: ICD-10-CM

## 2023-08-18 DIAGNOSIS — M79.641 BILATERAL HAND PAIN: ICD-10-CM

## 2023-08-22 RX ORDER — NAPROXEN 500 MG/1
500 TABLET ORAL 2 TIMES DAILY WITH MEALS
Qty: 60 TABLET | Refills: 0 | Status: SHIPPED | OUTPATIENT
Start: 2023-08-22

## 2023-09-07 ENCOUNTER — TELEPHONE (OUTPATIENT)
Dept: NEUROLOGY | Facility: CLINIC | Age: 68
End: 2023-09-07

## 2023-09-07 NOTE — TELEPHONE ENCOUNTER
Pt called in to ask if she had an appt for tomorrow. Reviewed the chart. Last office visit was on 04/25/2018 [place]. Pt stated that she had a balance issue today. Stated that she was looking up at the ceiling and lost her balance. Pt stated that she called her cardiologist and was advised to be been seen by her PCP or go to the ER. Pt stated that her PCP was out of town. 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 I too also recommended she reach out to her PCP to schedule an appt and ask if she could be placed on a waiting list if the appt was far out. Pt agreed and stated that she would do just that.

## 2023-09-22 DIAGNOSIS — M18.0 ARTHRITIS OF CARPOMETACARPAL (CMC) JOINT OF BOTH THUMBS: ICD-10-CM

## 2023-09-22 DIAGNOSIS — M79.642 BILATERAL HAND PAIN: ICD-10-CM

## 2023-09-22 DIAGNOSIS — M79.641 BILATERAL HAND PAIN: ICD-10-CM

## 2023-09-23 NOTE — TELEPHONE ENCOUNTER
Call to this patient and this was automatic refill sent by her pharmacy she only uses if need and has plenty remaining and does not need a refill at this time.

## 2023-09-23 NOTE — TELEPHONE ENCOUNTER
Requested medication(s) are due for refill today: no  Patient has already received a courtesy refill: no  Other reason request has been forwarded to provider: to decline refill

## 2023-09-25 RX ORDER — NAPROXEN 500 MG/1
500 TABLET ORAL 2 TIMES DAILY WITH MEALS
Qty: 60 TABLET | Refills: 0 | Status: SHIPPED | OUTPATIENT
Start: 2023-09-25

## 2023-10-21 DIAGNOSIS — M79.641 BILATERAL HAND PAIN: ICD-10-CM

## 2023-10-21 DIAGNOSIS — M79.642 BILATERAL HAND PAIN: ICD-10-CM

## 2023-10-21 DIAGNOSIS — M18.0 ARTHRITIS OF CARPOMETACARPAL (CMC) JOINT OF BOTH THUMBS: ICD-10-CM

## 2023-10-23 RX ORDER — NAPROXEN 500 MG/1
500 TABLET ORAL 2 TIMES DAILY WITH MEALS
Qty: 60 TABLET | Refills: 0 | Status: SHIPPED | OUTPATIENT
Start: 2023-10-23

## 2023-11-20 DIAGNOSIS — M79.641 BILATERAL HAND PAIN: ICD-10-CM

## 2023-11-20 DIAGNOSIS — M79.642 BILATERAL HAND PAIN: ICD-10-CM

## 2023-11-20 DIAGNOSIS — M18.0 ARTHRITIS OF CARPOMETACARPAL (CMC) JOINT OF BOTH THUMBS: ICD-10-CM

## 2023-11-20 RX ORDER — NAPROXEN 500 MG/1
500 TABLET ORAL 2 TIMES DAILY WITH MEALS
Qty: 60 TABLET | Refills: 0 | Status: SHIPPED | OUTPATIENT
Start: 2023-11-20

## 2024-07-03 ENCOUNTER — TELEPHONE (OUTPATIENT)
Dept: NEUROLOGY | Facility: CLINIC | Age: 69
End: 2024-07-03

## 2024-07-03 NOTE — TELEPHONE ENCOUNTER
Called pt and confirmed their   11a  appt on  7/15   w/  David . Reminded pt to arrive 15 mins prior to appt to check in with .    DISPLAY PLAN FREE TEXT

## 2024-07-15 ENCOUNTER — CONSULT (OUTPATIENT)
Dept: NEUROLOGY | Facility: CLINIC | Age: 69
End: 2024-07-15
Payer: MEDICARE

## 2024-07-15 VITALS
TEMPERATURE: 98.3 F | DIASTOLIC BLOOD PRESSURE: 70 MMHG | BODY MASS INDEX: 30.18 KG/M2 | WEIGHT: 170.3 LBS | SYSTOLIC BLOOD PRESSURE: 148 MMHG | HEIGHT: 63 IN

## 2024-07-15 DIAGNOSIS — G47.00 INSOMNIA: ICD-10-CM

## 2024-07-15 DIAGNOSIS — E66.9 OBESITY (BMI 30-39.9): ICD-10-CM

## 2024-07-15 DIAGNOSIS — G47.33 OSA (OBSTRUCTIVE SLEEP APNEA): ICD-10-CM

## 2024-07-15 DIAGNOSIS — F41.9 ANXIETY AND DEPRESSION: ICD-10-CM

## 2024-07-15 DIAGNOSIS — R41.3 MEMORY PROBLEM: ICD-10-CM

## 2024-07-15 DIAGNOSIS — F32.A ANXIETY AND DEPRESSION: ICD-10-CM

## 2024-07-15 DIAGNOSIS — G43.009 MIGRAINE WITHOUT AURA AND WITHOUT STATUS MIGRAINOSUS, NOT INTRACTABLE: Primary | ICD-10-CM

## 2024-07-15 DIAGNOSIS — E53.8 B12 DEFICIENCY: ICD-10-CM

## 2024-07-15 DIAGNOSIS — R42 DIZZINESS: ICD-10-CM

## 2024-07-15 PROCEDURE — G2211 COMPLEX E/M VISIT ADD ON: HCPCS | Performed by: STUDENT IN AN ORGANIZED HEALTH CARE EDUCATION/TRAINING PROGRAM

## 2024-07-15 PROCEDURE — 99205 OFFICE O/P NEW HI 60 MIN: CPT | Performed by: STUDENT IN AN ORGANIZED HEALTH CARE EDUCATION/TRAINING PROGRAM

## 2024-07-15 NOTE — PROGRESS NOTES
[facility] Consult  PATIENT:  Mariola Schaefer  MRN:  [id number]  :  1955  DATE OF SERVICE:  7/15/2024  REFERRED BY: Self, Referral  PMD: Isma Ramires MD    Assessment/Plan:     Mariola Schaefer is a very pleasant 68 y.o. female with a past medical history that includes anxiety, hypertension, depression, diabetes mellitus, GERD, insomnia referred here for evaluation of headache.    Initial evaluation 7/15/2024     Ms. Schaefer reports a longstanding history of headaches since she was about 21 years old.  They had gone away for some time, but have returned recently. Based on the description of her headaches, I believe she has migraines.  We discussed a variety of potential treatment options, but she was open to trying magnesium and B2 supplements for prevention.  From an abortive standpoint, she will continue with Tylenol, but emphasized the importance of taking this medication early at the onset of her headache and not taking it more than 2 to 3 days/week to avoid medication overuse.  Outside of headaches, she endorses vague episodes of dizziness which are possibly headache related, but she is also seeing cardiology tomorrow and will discuss this in further detail with them.  Furthermore, she endorses difficulties with her memory which has been going on for a long time.  She was previously evaluated in the neurology clinic for this many years ago.  It is likely multifactorial in origin in the setting of untreated obstructive sleep apnea, frequent headache days, and B12 deficiency.    Migraine without aura and without status migrainosus, not intractable  Anxiety and depression  Insomnia  Dizziness  -     Vitamin B12; Future  -     Folate; Future  -     TSH, 3rd generation with Free T4 reflex; Future    Obesity (BMI 30-39.9)  ADELAIDA (obstructive sleep apnea)  Memory problem  B12 deficiency    Workup:  - Neurologic assessment reveals unremarkable neurological exam.  - CT head  without contrast 5/29/2024: No acute intracranial finding  - MRI brain with and without contrast 4/14/2023: No acute findings.  Mild white matter disease.  No postcontrast enhancement.  - Labs: B12, Folate, TSH    Preventative:  - we discussed headache hygiene and lifestyle factors that may improve headaches  - Mg and B2  - Currently on through other providers: Sertraline, metoprolol, losartan  - Past/ failed/contraindicated: Avoid TCA's due to age, Wellbutrin, Paroxetine  - future options: Memantine, Diamox, Topamax, CGRP med, botox    Acute:  - discussed not taking over-the-counter or prescription pain medications more than 3 days per week to prevent medication overuse/rebound headache  - Currently on through other providers: Tizanidine  - Past/ failed/contraindicated: None  - future options:  Triptan, prochlorperazine, Toradol IM or p.o., could consider trial of 5 days of Depakote 500 mg nightly or dexamethasone 2 mg daily for prolonged migraine, ubrelvy, reyvow, nurtec, zavzpret  Patient instructions   Additional Testing  Labs: B12, Folate, TSH    Headache Calendar  Please maintain a headache calendar  Consider using phone applications such as Migraine Silvestre or [employer] Migraine Tracker    Headache/migraine treatment:   Acute medications (for immediate treatment of a headache):   It is ok to take ibuprofen, acetaminophen or naproxen (Advil, Tylenol,  Aleve, Excedrin) if they help your headaches you should limit these to No more than 2-3 times a week to avoid medication overuse/rebound headaches.     Over the counter preventive supplements for headaches/migraines (if you try, try for 3 months straight)  (to take every day to help prevent headaches - not to take at the time of headache):  [x] Magnesium (oxide or glycinate) 400mg daily (If any diarrhea or upset stomach, decrease dose  as tolerated)  [x] Riboflavin (Vitamin B2) 400mg daily - (FYI B2 may make your urine bright/neon yellow)    Lifestyle  Recommendations:  [x] SLEEP - Maintain a regular sleep schedule: Adults need at least 7-8 hours of uninterrupted a night. Maintain good sleep hygiene:  Going to bed and waking up at consistent times, avoiding excessive daytime naps, avoiding caffeinated beverages in the evening, avoid excessive stimulation in the evening and generally using bed primarily for sleeping.  One hour before bedtime would recommend turning lights down lower, decreasing your activity (may read quietly, listen to music at a low volume). When you get into bed, should eliminate all technology (no texting, emailing, playing with your phone, iPad or tablet in bed).  [x] HYDRATION - Maintain good hydration.  Drink  2L of fluid a day (4 typical small water bottles)  [x] DIET - Maintain good nutrition. In particular don't skip meals and try and eat healthy balanced meals regularly.  [x] TRIGGERS - Look for other triggers and avoid them: Limit caffeine to 1-2 cups a day or less. Avoid dietary triggers that you have noticed bring on your headaches (this could include aged cheese, peanuts, MSG, aspartame and nitrates).  [x] EXERCISE - physical exercise as we all know is good for you in many ways, and not only is good for your heart, but also is beneficial for your mental health, cognitive health and  chronic pain/headaches. I would encourage at the least 5 days of physical exercise weekly for at least 30 minutes.     Education and Follow-up  [x] Please call with any questions or concerns. Of course if any new concerning symptoms go to the emergency department.  [x] Follow up in 3 months  CC:   We had the pleasure of evaluating Mariola Schaefer in neurological consultation today. Mariola Schaefer is a   right handed female who presents today for evaluation of headaches.     History obtained from patient as well as available medical record review.  History of Present Illness:   Current medical illnesses  or past medical history include anxiety, hypertension,  depression, diabetes mellitus, GERD, insomnia    Pertinent history:  -Patient was seen in the ED on 5/29/2024 for hypertension and headache.  Reported missing some doses of medications in the days prior.    Headaches started at what age? 21 years old  How often do the headaches occur?   - as of 7/15/2024: 15/30 (of those, none are severe)  What time of the day do the headaches start?  Evening  How long do the headaches last? 4-6 hours if severe enough  Are you ever headache free? Yes    Aura? without aura     Where is your headache located and pain quality? Left occiput - multiple pain types (primarily aching)  What is the intensity of pain? Worst 7/10, Average: 5/10  Associated symptoms:   [x] Nausea (infrequent)  [x] Stiff or sore neck   [x] Photophobia  [x] Prefer quiet, dark room  [x] Light-headed or Dizzy       Things that make the headache worse? No specific movements    Headache triggers:  None    Have you seen someone else for headaches or pain? No  Have you had trigger point injection performed and how often? No  Have you had Botox injection performed and how often? No   Have you had epidural injections or transforaminal injections performed? No  Are you current pregnant or planning on getting pregnant? No  Have you ever had any Brain imaging? yes    Last eye exam: a few months ago - dilated exam. Floaters. Wears glasses    What medications do you take or have you taken for your headaches?   ABORTIVE:    OTC medications: Tylenol (about 5 days per week)  Prescription: Tizanidine    Past/ failed/contraindicated:  OTC medications: None  Prescription: None    PREVENTIVE:   Sertraline, metoprolol, losartan    Past/ failed/contraindicated:  Avoid TCA's due to age, Wellbutrin, Paroxetine      LIFESTYLE  Sleep   - averages: about 4-5 hours per night  Problems falling asleep?:   Yes  Problems staying asleep?:  Yes  - Positive history of snoring  - Prior home sleep study with mild ADELAIDA  - Following with [facility]  Pulmonology    Physical activity: In PT now. Nothing outside of that    Water: about 6 bottles per day  Caffeine: 1 cup of coffee per day    Mood:   History of anxiety and depression  - Follows with psychologist    The following portions of the patient's history were reviewed and updated as appropriate: allergies, current medications, past family history, past medical history, past social history, past surgical history and problem list.    Pertinent family history:  Family history of headaches:  no known family members with significant headaches  Any family history of aneurysms - No    Pertinent social history:  Work: Retired  Education: [employer]  Lives with     Illicit Drugs: denies  Alcohol/tobacco: Denies tobacco use, alcohol intake: social drinker  Past Medical History:     Past Medical History:   Diagnosis Date    Anxiety disorder     Benign essential hypertension     Depressive disorder     Diabetes mellitus with complication (HCC)     SANDI (generalized anxiety disorder)     GERD (gastroesophageal reflux disease)     Heart murmur     Insomnia     Knee pain, left     Lymphadenopathy     MCI (mild cognitive impairment)     Neck pain     Palpitations     Psoriasis vulgaris     Seborrheic keratosis     Secondary polycythemia     Vitamin D deficiency        Patient Active Problem List   Diagnosis    MCI (mild cognitive impairment)    Arthritis of carpometacarpal (CMC) joint of both thumbs       Medications:      Current Outpatient Medications   Medication Sig Dispense Refill    aspirin (ECOTRIN LOW STRENGTH) 81 mg EC tablet Take 81 mg by mouth daily      atorvastatin (LIPITOR) 10 mg tablet Take 1 tablet by mouth daily      atorvastatin (LIPITOR) 20 mg tablet       Cholecalciferol 25 MCG (1000 UT) tablet Take 1,000 Units by mouth daily      cyanocobalamin (VITAMIN B-12) 1000 MCG tablet Take 1,000 mcg by mouth daily      insulin glargine (LANTUS) 100 units/mL subcutaneous injection Inject under the skin       losartan (COZAAR) 25 mg tablet Take 1 tablet by mouth daily      metFORMIN (GLUCOPHAGE-XR) 500 mg 24 hr tablet       metoprolol tartrate (LOPRESSOR) 25 mg tablet Take 1 tablet by mouth daily      sertraline (ZOLOFT) 25 mg tablet       tiZANidine (ZANAFLEX) 4 mg tablet Take 1 tablet (4 mg total) by mouth 4 (four) times a day as needed for muscle spasms 120 tablet 0    alendronate (FOSAMAX) 70 mg tablet Take 70 mg by mouth Once a week (Patient not taking: Reported on 7/15/2024)      ALPRAZolam ER (XANAX XR) 1 MG 24 hr tablet Take 1 tablet by mouth 2 (two) times a day as needed (Patient not taking: Reported on 6/29/2023)      Empagliflozin (Jardiance) 25 MG TABS Take 25 mg by mouth daily   (Patient not taking: Reported on 5/10/2022)      Ertugliflozin L-PyroglutamicAc (Steglatro) 15 MG TABS Take 15 mg by mouth daily (Patient not taking: Reported on 6/29/2023)      fluocinonide (LIDEX) 0.05 % cream Apply topically 2 (two) times a day To psoriasis patches till resolve (Patient not taking: Reported on 7/15/2024) 30 g 1    hydrocortisone 2.5 % cream Apply topically 2 (two) times a day To psoriasis till clear (Patient not taking: Reported on 7/15/2024) 20 g 1    melatonin 1 mg Take 10 mg by mouth daily at bedtime   (Patient not taking: Reported on 6/29/2023)      metFORMIN (GLUCOPHAGE-XR) 750 mg 24 hr tablet Take by mouth (Patient not taking: Reported on 6/29/2023)      naproxen (NAPROSYN) 500 mg tablet TAKE ONE TABLET BY MOUTH TWICE DAILY WITH FOOD (Patient not taking: Reported on 7/15/2024) 60 tablet 0    PARoxetine (PAXIL) 20 mg tablet every 24 hours (Patient not taking: Reported on 6/29/2023)      temazepam (RESTORIL) 15 mg capsule  (Patient not taking: Reported on 7/15/2024)      tolterodine (DETROL LA) 4 mg 24 hr capsule Take 4 mg by mouth daily (Patient not taking: Reported on 6/29/2023)       No current facility-administered medications for this visit.        Allergies:      Allergies   Allergen Reactions     Acetaminophen Other (See Comments)    Escitalopram Hives    Hydrocodone-Acetaminophen Other (See Comments)    Liraglutide Hives and Vomiting    Penicillins Hives    Tuberculin Ppd Other (See Comments)    Latex Rash    Sulfa Antibiotics Rash       Family History:     Family History   Problem Relation Age of Onset    Diabetes Mother        Social History:       Social History     Socioeconomic History    Marital status: /Civil Union     Spouse name: Not on file    Number of children: Not on file    Years of education: Not on file    Highest education level: Not on file   Occupational History    Not on file   Tobacco Use    Smoking status: Former    Smokeless tobacco: Never   Vaping Use    Vaping status: Never Used   Substance and Sexual Activity    Alcohol use: No    Drug use: No    Sexual activity: Not on file   Other Topics Concern    Not on file   Social History Narrative    Not on file     Social Determinants of Health     Financial Resource Strain: Medium Risk (3/1/2024)    Received from     Overall Financial Resource Strain (CARDIA)     Difficulty of Paying Living Expenses: Somewhat hard   Food Insecurity: Food Insecurity Present (3/1/2024)    Received from     Hunger Vital Sign     Worried About Running Out of Food in the Last Year: Never true     Ran Out of Food in the Last Year: Sometimes true   Transportation Needs: No Transportation Needs (3/1/2024)    Received from     PRAPARE - Transportation     Lack of Transportation (Medical): No     Lack of Transportation (Non-Medical): No   Physical Activity: Not on file   Stress: Not on file   Social Connections: Not on file   Intimate Partner Violence: Not At Risk (3/1/2024)    Received from     Humiliation, Afraid, Rape, and Kick questionnaire     Fear of Current or Ex-Partner: No     Emotionally Abused: No     Physically Abused: No     Sexually  "Abused: No   Housing Stability: Not on file         Objective:   Physical Exam:                                                               Vitals:            Constitutional:  /70 (BP Location: Left arm, Patient Position: Sitting, Cuff Size: Adult)   Temp 98.3 °F (36.8 °C) (Temporal)   Ht 5' 3\" (1.6 m)   Wt 77.2 kg (170 lb 4.8 oz)   BMI 30.17 kg/m²   BP Readings from Last 3 Encounters:   07/15/24 148/70   06/29/23 159/71   10/26/22 137/83     Pulse Readings from Last 3 Encounters:   06/29/23 91   10/26/22 84   05/10/22 77         Well developed, well nourished, well groomed. No dysmorphic features.       HEENT:  Normocephalic atraumatic.   Oropharynx is clear and moist. No oral mucosal lesions.   Chest:  Respirations regular and unlabored.    Cardiovascular:  Distal extremities warm without palpable edema or tenderness, no observed significant swelling.    Musculoskeletal:  (see below under neurologic exam for evaluation of motor function and gait)   Skin:  warm and dry, not diaphoretic. No apparent birthmarks or stigmata of neurocutaneous disease.   Psychiatric:  Normal behavior and appropriate affect       Neurological Examination:     Mental status/cognitive function:   Orientated to time, place and person. Recent and remote memory intact. Attention span and concentration as well as fund of knowledge are appropriate for age. Normal language and spontaneous speech.    Cranial Nerves:  II-visual fields full.   Fundi poorly visualized due to pupillary constriction  III, IV, VI-Pupils were equal, round, and reactive to light and accomodation. Extraocular movements were full and conjugate without nystagmus.  Conjugate gaze, normal smooth pursuits, normal saccades   V-facial sensation symmetric.    VII-facial expression symmetric, intact forehead wrinkle, strong eye closure, symmetric smile    VIII-hearing grossly intact bilaterally   IX, X-palate elevation symmetric, no dysarthria.   XI-shoulder shrug " strength intact    XII-tongue protrusion midline.    Motor Exam: symmetric bulk and tone throughout, no pronator drift. Power/strength 4/5 bilateral upper and lower extremities, no atrophy, fasciculations or abnormal movements noted.   Sensory: grossly intact light touch in all extremities.   Reflexes: brachioradialis 2+, biceps 2+, knee 2+, ankle 2+ bilaterally. No ankle clonus  Coordination: Finger nose finger intact bilaterally, no apparent dysmetria, ataxia or tremor noted  Gait: steady casual and tandem gait.     Pertinent lab results: None     Pertinent Imaging:   -CT head without contrast 5/29/2024: No acute intracranial finding  -MRI brain with and without contrast 4/14/2023: No acute findings.  Mild white matter disease.  No postcontrast enhancement.    I have personally reviewed imaging and radiology read  Review of Systems:   Constitutional:  Negative for appetite change, fatigue and fever.   HENT: Negative.  Negative for hearing loss, tinnitus, trouble swallowing and voice change.    Eyes: Negative.  Negative for photophobia, pain and visual disturbance.   Respiratory: Negative.  Negative for shortness of breath.    Cardiovascular: Negative.  Negative for palpitations.   Gastrointestinal: Negative.  Negative for nausea and vomiting.   Endocrine: Negative.  Negative for cold intolerance.   Genitourinary: Negative.  Negative for dysuria, frequency and urgency.   Musculoskeletal:  Negative for back pain, gait problem, myalgias, neck pain and neck stiffness.   Skin: Negative.  Negative for rash.   Allergic/Immunologic: Negative.    Neurological:  Positive for dizziness and headaches (At night only). Negative for tremors, seizures, syncope, facial asymmetry, speech difficulty, weakness, light-headedness and numbness.   Hematological: Negative.  Does not bruise/bleed easily.   Psychiatric/Behavioral: Negative.  Negative for confusion, hallucinations and sleep disturbance.    All other systems reviewed and are  negative.       I have spent 45 minutes with the patient today in which greater than 50% of this time was spent in counseling/coordination of care regarding Diagnostic results, Prognosis, Risks and benefits of tx options, Patient and family education, Importance of tx compliance, Impressions, Documenting in the medical record, Reviewing / ordering tests, medicine, procedures  , and Obtaining or reviewing history  . I also spent 15 minutes non face to face for this patient the same day.     Activity Minutes   Precharting/reviewing 10   Patient care/counseling 45   Postcharting/care coordination 5       Author:  Ac Hernandez DO 7/15/2024 11:14 AM

## 2024-07-15 NOTE — PATIENT INSTRUCTIONS
Additional Testing  Labs: B12, Folate, TSH    Headache Calendar  Please maintain a headache calendar  Consider using phone applications such as Migraine Silvestre or [place] Migraine Tracker    Headache/migraine treatment:   Acute medications (for immediate treatment of a headache):   It is ok to take ibuprofen, acetaminophen or naproxen (Advil, Tylenol,  Aleve, Excedrin) if they help your headaches you should limit these to No more than 2-3 times a week to avoid medication overuse/rebound headaches.     Over the counter preventive supplements for headaches/migraines (if you try, try for 3 months straight)  (to take every day to help prevent headaches - not to take at the time of headache):  [x] Magnesium (oxide or glycinate) 400mg daily (If any diarrhea or upset stomach, decrease dose  as tolerated)  [x] Riboflavin (Vitamin B2) 400mg daily - (FYI B2 may make your urine bright/neon yellow)    Lifestyle Recommendations:  [x] SLEEP - Maintain a regular sleep schedule: Adults need at least 7-8 hours of uninterrupted a night. Maintain good sleep hygiene:  Going to bed and waking up at consistent times, avoiding excessive daytime naps, avoiding caffeinated beverages in the evening, avoid excessive stimulation in the evening and generally using bed primarily for sleeping.  One hour before bedtime would recommend turning lights down lower, decreasing your activity (may read quietly, listen to music at a low volume). When you get into bed, should eliminate all technology (no texting, emailing, playing with your phone, iPad or tablet in bed).  [x] HYDRATION - Maintain good hydration.  Drink  2L of fluid a day (4 typical small water bottles)  [x] DIET - Maintain good nutrition. In particular don't skip meals and try and eat healthy balanced meals regularly.  [x] TRIGGERS - Look for other triggers and avoid them: Limit caffeine to 1-2 cups a day or less. Avoid dietary triggers that you have noticed bring on your headaches (this  could include aged cheese, peanuts, MSG, aspartame and nitrates).  [x] EXERCISE - physical exercise as we all know is good for you in many ways, and not only is good for your heart, but also is beneficial for your mental health, cognitive health and  chronic pain/headaches. I would encourage at the least 5 days of physical exercise weekly for at least 30 minutes.     Education and Follow-up  [x] Please call with any questions or concerns. Of course if any new concerning symptoms go to the emergency department.  [x] Follow up in 3 months

## 2024-07-15 NOTE — PROGRESS NOTES
Review of Systems   Constitutional:  Negative for appetite change, fatigue and fever.   HENT: Negative.  Negative for hearing loss, tinnitus, trouble swallowing and voice change.    Eyes: Negative.  Negative for photophobia, pain and visual disturbance.   Respiratory: Negative.  Negative for shortness of breath.    Cardiovascular: Negative.  Negative for palpitations.   Gastrointestinal: Negative.  Negative for nausea and vomiting.   Endocrine: Negative.  Negative for cold intolerance.   Genitourinary: Negative.  Negative for dysuria, frequency and urgency.   Musculoskeletal:  Negative for back pain, gait problem, myalgias, neck pain and neck stiffness.   Skin: Negative.  Negative for rash.   Allergic/Immunologic: Negative.    Neurological:  Positive for dizziness and headaches (At night only). Negative for tremors, seizures, syncope, facial asymmetry, speech difficulty, weakness, light-headedness and numbness.   Hematological: Negative.  Does not bruise/bleed easily.   Psychiatric/Behavioral: Negative.  Negative for confusion, hallucinations and sleep disturbance.    All other systems reviewed and are negative.